# Patient Record
Sex: MALE | Race: WHITE | URBAN - METROPOLITAN AREA
[De-identification: names, ages, dates, MRNs, and addresses within clinical notes are randomized per-mention and may not be internally consistent; named-entity substitution may affect disease eponyms.]

---

## 2017-10-04 ENCOUNTER — OFFICE VISIT (OUTPATIENT)
Dept: FAMILY MEDICINE | Facility: CLINIC | Age: 52
End: 2017-10-04

## 2017-10-04 VITALS
TEMPERATURE: 98.3 F | HEIGHT: 69 IN | DIASTOLIC BLOOD PRESSURE: 88 MMHG | HEART RATE: 60 BPM | WEIGHT: 201.6 LBS | BODY MASS INDEX: 29.86 KG/M2 | RESPIRATION RATE: 20 BRPM | SYSTOLIC BLOOD PRESSURE: 136 MMHG

## 2017-10-04 DIAGNOSIS — E06.3 HASHIMOTO'S THYROIDITIS: ICD-10-CM

## 2017-10-04 DIAGNOSIS — E78.5 HYPERLIPIDEMIA LDL GOAL <130: ICD-10-CM

## 2017-10-04 DIAGNOSIS — Z76.89 ENCOUNTER TO ESTABLISH CARE: Primary | ICD-10-CM

## 2017-10-04 DIAGNOSIS — Z71.89 ACP (ADVANCE CARE PLANNING): ICD-10-CM

## 2017-10-04 DIAGNOSIS — Z23 NEED FOR VACCINATION: ICD-10-CM

## 2017-10-04 DIAGNOSIS — F41.1 GENERALIZED ANXIETY DISORDER: ICD-10-CM

## 2017-10-04 PROBLEM — G47.33 OSA (OBSTRUCTIVE SLEEP APNEA): Status: ACTIVE | Noted: 2017-10-04

## 2017-10-04 PROBLEM — E55.9 VITAMIN D DEFICIENCY: Status: ACTIVE | Noted: 2017-10-04

## 2017-10-04 PROCEDURE — 90472 IMMUNIZATION ADMIN EACH ADD: CPT | Performed by: PHYSICIAN ASSISTANT

## 2017-10-04 PROCEDURE — 36415 COLL VENOUS BLD VENIPUNCTURE: CPT | Performed by: PHYSICIAN ASSISTANT

## 2017-10-04 PROCEDURE — 90686 IIV4 VACC NO PRSV 0.5 ML IM: CPT | Performed by: PHYSICIAN ASSISTANT

## 2017-10-04 PROCEDURE — 84439 ASSAY OF FREE THYROXINE: CPT | Mod: 90 | Performed by: PHYSICIAN ASSISTANT

## 2017-10-04 PROCEDURE — 84443 ASSAY THYROID STIM HORMONE: CPT | Mod: 90 | Performed by: PHYSICIAN ASSISTANT

## 2017-10-04 PROCEDURE — 99202 OFFICE O/P NEW SF 15 MIN: CPT | Mod: 25 | Performed by: PHYSICIAN ASSISTANT

## 2017-10-04 PROCEDURE — 90715 TDAP VACCINE 7 YRS/> IM: CPT | Performed by: PHYSICIAN ASSISTANT

## 2017-10-04 PROCEDURE — 90471 IMMUNIZATION ADMIN: CPT | Performed by: PHYSICIAN ASSISTANT

## 2017-10-04 RX ORDER — PRAVASTATIN SODIUM 20 MG
20 TABLET ORAL DAILY
Qty: 90 TABLET | Refills: 3 | COMMUNITY
Start: 2017-10-04 | End: 2017-10-04

## 2017-10-04 RX ORDER — LEVOTHYROXINE SODIUM 100 UG/1
100 TABLET ORAL DAILY
Qty: 90 TABLET | Refills: 3 | Status: SHIPPED | OUTPATIENT
Start: 2017-10-04 | End: 2018-10-19 | Stop reason: DRUGHIGH

## 2017-10-04 RX ORDER — VENLAFAXINE HYDROCHLORIDE 150 MG/1
150 CAPSULE, EXTENDED RELEASE ORAL DAILY
Qty: 90 CAPSULE | Refills: 0 | Status: SHIPPED | OUTPATIENT
Start: 2017-10-04 | End: 2018-02-05

## 2017-10-04 RX ORDER — PRAVASTATIN SODIUM 20 MG
20 TABLET ORAL DAILY
Qty: 90 TABLET | Refills: 0 | Status: SHIPPED | OUTPATIENT
Start: 2017-10-04 | End: 2018-02-19

## 2017-10-04 RX ORDER — LEVOTHYROXINE SODIUM 100 UG/1
100 TABLET ORAL DAILY
Qty: 90 TABLET | Refills: 1 | COMMUNITY
Start: 2017-10-04 | End: 2017-10-04

## 2017-10-04 RX ORDER — VENLAFAXINE HYDROCHLORIDE 150 MG/1
150 CAPSULE, EXTENDED RELEASE ORAL DAILY
Qty: 90 CAPSULE | Refills: 1 | COMMUNITY
Start: 2017-10-04 | End: 2017-10-04

## 2017-10-04 NOTE — MR AVS SNAPSHOT
After Visit Summary   10/4/2017    Claude Dorsey    MRN: [id number]           Patient Information     Date Of Birth          1965        Visit Information        Provider Department      10/4/2017 11:00 AM Alberta Singh PA-C [facility]        Today's Diagnoses     Encounter to establish care    -  1    Hashimoto's thyroiditis        Hyperlipidemia LDL goal <130        Generalized anxiety disorder        Need for vaccination        ACP (advance care planning)           Follow-ups after your visit        Follow-up notes from your care team     Return in about 3 months (around 1/4/2018) for Physical Exam, Lab Work.      Who to contact     If you have questions or need follow up information about today's clinic visit or your schedule please contact [facility] directly at [phone].  Normal or non-critical lab and imaging results will be communicated to you by Solarcenturyhart, letter or phone within 4 business days after the clinic has received the results. If you do not hear from us within 7 days, please contact the clinic through Solarcenturyhart or phone. If you have a critical or abnormal lab result, we will notify you by phone as soon as possible.  Submit refill requests through ZinkoTek or call your pharmacy and they will forward the refill request to us. Please allow 3 business days for your refill to be completed.          Additional Information About Your Visit        Solarcenturyhart Information     ZinkoTek gives you secure access to your electronic health record. If you see a primary care provider, you can also send messages to your care team and make appointments. If you have questions, please call your primary care clinic.  If you do not have a primary care provider, please call [phone] and they will assist you.        Care EveryWhere ID     This is your Care EveryWhere ID. This could be used by other organizations to access your [facility]  "records  [id number]        Your Vitals Were     Pulse Temperature Respirations Height BMI (Body Mass Index)       60 98.3  F (36.8  C) (Oral) 20 1.74 m (5' 8.5\") 30.21 kg/m2        Blood Pressure from Last 3 Encounters:   10/04/17 136/88    Weight from Last 3 Encounters:   10/04/17 91.4 kg (201 lb 9.6 oz)              We Performed the Following     HC FLU VAC PRESRV FREE QUAD SPLIT VIR 3+YRS IM     T4 free ([facility])     TDAP VACCINE (BOOSTRIX)     TSH ([facility])     VACCINE ADMINISTRATION, EACH ADDITIONAL     VACCINE ADMINISTRATION, INITIAL     VENOUS COLLECTION          Today's Medication Changes          These changes are accurate as of: 10/4/17  4:46 PM.  If you have any questions, ask your nurse or doctor.               Start taking these medicines.        Dose/Directions    levothyroxine 100 MCG tablet   Commonly known as:  SYNTHROID/LEVOTHROID   Used for:  Hashimoto's thyroiditis   Started by:  Alberta Singh PA-C        Dose:  100 mcg   Take 1 tablet (100 mcg) by mouth daily   Quantity:  90 tablet   Refills:  3            Where to get your medicines      These medications were sent to [facility] [address] [place], [place] - [address]  [address] [facility] [address]     Phone:  [phone]     levothyroxine 100 MCG tablet    pravastatin 20 MG tablet    venlafaxine 150 MG 24 hr capsule                Primary Care Provider Office Phone # Fax #    Alberta Singh PA-C [phone]       [facility] [address]  [facility] [address]        Equal Access to Services     [facility] AH: Hadii aad ku hadasho Soomaali, waaxda luqadaha, qaybta kaalmada adeegyada, ashley coates haybarber forman. So [facility] [phone].    ATENCIÓN: Si habla español, tiene a mckeon disposición servicios gratuitos de asistencia lingüística. Llame al [phone].    We comply with applicable federal civil rights laws and [place] laws. We do not discriminate " on the basis of race, color, national origin, age, disability, sex, sexual orientation, or gender identity.            Thank you!     Thank you for choosing [facility]  for your care. Our goal is always to provide you with excellent care. Hearing back from our patients is one way we can continue to improve our services. Please take a few minutes to complete the written survey that you may receive in the mail after your visit with us. Thank you!             Your Updated Medication List - Protect others around you: Learn how to safely use, store and throw away your medicines at www.disposemymeds.org.          This list is accurate as of: 10/4/17  4:46 PM.  Always use your most recent med list.                   Brand Name Dispense Instructions for use Diagnosis    levothyroxine 100 MCG tablet    SYNTHROID/LEVOTHROID    90 tablet    Take 1 tablet (100 mcg) by mouth daily    Hashimoto's thyroiditis       pravastatin 20 MG tablet    PRAVACHOL    90 tablet    Take 1 tablet (20 mg) by mouth daily    Hyperlipidemia LDL goal <130       venlafaxine 150 MG 24 hr capsule    EFFEXOR-XR    90 capsule    Take 1 capsule (150 mg) by mouth daily    Generalized anxiety disorder

## 2017-10-04 NOTE — LETTER
[facility]  [address]  [address]  [facility] [address]  [phone]      October 4, 2017      Claude Ericksonphrey  [address] [facility] [address]      EMERGENCY CARE PLAN  Presenting Problem Treatment Plan   Questions or concerns during clinic hours I will call the clinic directly:    [facility]  [address]  [place] [address]  [phone]   Questions or concerns outside clinic hours  I will call the 24 hour line at [phone]   Patient needs to schedule an appointment  I will call the  scheduling line at [phone]   Same day treatment   I will call the clinic first, then  urgent care and/or  express care if needed   Clinic Care Coordinators MOIRA Contreras:  [phone]  Yessica Danielle RN:  [phone]  [facility] Clinical Support Staff: [phone]    Crisis Services:  Behavioral or Mental Health BHP (Behavioral Health Providers)   [phone]   Emergency treatment--Immediately CALL [phone]

## 2017-10-04 NOTE — PROGRESS NOTES
"CC: Establish     History:  Emerson is a new patient to our clinic from [facility]. He is generally healthy, but does take medications for hypothyroid, MIKE, and mixed hyperlipidemia.    Has been off levothyroxine for 3 days now. Back in February was increased from 75 mcg to 100 mcg has not had blood checked since that time    Taking Effexor  mg daily. Tolerates this well. Feels that it works well to control excess anxiety. No side effects.    Pravastatin for hyperlipidemia. Tolerates well. No side effects. No muscle aches, joint pains from medication.     Chart updated with PMH, surgery, family history, etc. Will scan relevant records into chart for future reference. Plans to return for physical in the next several months. Would like refill of 3 medications until that time.      PMH, MEDICATIONS, ALLERGIES, SOCIAL AND FAMILY HISTORY in [facility] and reviewed by me personally.      ROS negative other than the symptoms noted above in the HPI.        Examination   /88 (BP Location: Left arm, Patient Position: Chair, Cuff Size: Adult Regular)  Pulse 60  Temp 98.3  F (36.8  C) (Oral)  Resp 20  Ht 1.74 m (5' 8.5\")  Wt 91.4 kg (201 lb 9.6 oz)  BMI 30.21 kg/m2       Constitutional: Sitting comfortably, in no acute distress. Vital signs noted  Eyes: pupils equal round reactive to light and accomodation, extra ocular movements intact  Neck:  no adenopathy, trachea midline and normal to palpation  Cardiovascular:  regular rate and rhythm, no murmurs, clicks, or gallops  Respiratory:  normal respiratory rate and rhythm, lungs clear to auscultation  SKIN: No jaundice/pallor/rash.   Psychiatric: mentation appears normal and affect normal/bright        A/P    ICD-10-CM    1. Hashimoto's thyroiditis E06.3 VENOUS COLLECTION     TSH ([facility])     T4 free ([facility])     levothyroxine (SYNTHROID/LEVOTHROID) 100 MCG tablet   2. Hyperlipidemia LDL goal <130 E78.5 pravastatin (PRAVACHOL) 20 MG tablet   3. Generalized anxiety " disorder F41.1 venlafaxine (EFFEXOR-XR) 150 MG 24 hr capsule   4. Need for vaccination Z23 HC FLU VAC PRESRV FREE QUAD SPLIT VIR 3+YRS IM     VACCINE ADMINISTRATION, INITIAL     TDAP VACCINE (BOOSTRIX)     VACCINE ADMINISTRATION, EACH ADDITIONAL   5. ACP (advance care planning) Z71.89        DISCUSSION:     (Z76.89) Encounter to establish care  (primary encounter diagnosis)  Comment: Emerson appears to be doing well with all his chronic conditions. Chart updated.    (E06.3) Hashimoto's thyroiditis  Comment: Will check non-fasting labs today and send 1 year of refills.   Plan: VENOUS COLLECTION, TSH ([facility]), T4 free         ([facility]), levothyroxine (SYNTHROID/LEVOTHROID)         100 MCG tablet    (E78.5) Hyperlipidemia LDL goal <130  Comment: Not fasting today, will send 3 months of refills, to reach fasting physical.   Plan: pravastatin (PRAVACHOL) 20 MG tablet    (F41.1) Generalized anxiety disorder  Comment: will send 3 months of refills, to reach fasting physical.   Plan: venlafaxine (EFFEXOR-XR) 150 MG 24 hr capsule    (Z23) Need for vaccination  Plan: HC FLU VAC PRESRV FREE QUAD SPLIT VIR 3+YRS IM,        VACCINE ADMINISTRATION, INITIAL, TDAP VACCINE         (BOOSTRIX), VACCINE ADMINISTRATION, EACH         ADDITIONAL    (Z71.89) ACP (advance care planning)      follow up visit: 3 months, physical, fasting.    Alberta Singh PA-C  [facility]

## 2017-10-04 NOTE — NURSING NOTE
Claude Dorsey is here to establish care and get to know the provider.    Questioned patient about current smoking habits.  Pt. has never smoked.  PULSE regular  My Chart: accepts  CLASSIFICATION OF OVERWEIGHT AND OBESITY BY BMI                        Obesity Class           BMI(kg/m2)  Underweight                                    < 18.5  Normal                                         18.5-24.9  Overweight                                     25.0-29.9  OBESITY                     I                  30.0-34.9                             II                 35.0-39.9  EXTREME OBESITY             III                >40                            Patient's  BMI Body mass index is 30.21 kg/(m^2).  http://hin.nhlbi.nih.gov/menuplanner/menu.cgi  Pre-visit planning  Immunizations - up to date  Colonoscopy - is up to date  Mammogram - na  Asthma -   PHQ9 -    MIKE-7 -

## 2017-10-05 ENCOUNTER — MYC REFILL (OUTPATIENT)
Dept: FAMILY MEDICINE | Facility: CLINIC | Age: 52
End: 2017-10-05

## 2017-10-05 DIAGNOSIS — F41.1 GENERALIZED ANXIETY DISORDER: ICD-10-CM

## 2017-10-05 LAB
T4, FREE, NON-DIALYSIS - QUEST: 0.9 NG/DL (ref 0.8–1.8)
TSH SERPL-ACNC: 4.57 MIU/L (ref 0.4–4.5)

## 2017-10-05 RX ORDER — VENLAFAXINE HYDROCHLORIDE 150 MG/1
150 CAPSULE, EXTENDED RELEASE ORAL DAILY
Qty: 90 CAPSULE | Refills: 0 | Status: CANCELLED | OUTPATIENT
Start: 2017-10-05

## 2018-01-09 ENCOUNTER — TRANSFERRED RECORDS (OUTPATIENT)
Dept: FAMILY MEDICINE | Facility: CLINIC | Age: 53
End: 2018-01-09

## 2018-01-09 PROBLEM — E66.9 OBESITY: Status: ACTIVE | Noted: 2018-01-09

## 2018-01-09 PROBLEM — N40.1 BENIGN PROSTATIC HYPERPLASIA WITH URINARY FREQUENCY: Status: ACTIVE | Noted: 2018-01-09

## 2018-01-09 PROBLEM — R35.0 BENIGN PROSTATIC HYPERPLASIA WITH URINARY FREQUENCY: Status: ACTIVE | Noted: 2018-01-09

## 2018-02-05 ENCOUNTER — TELEPHONE (OUTPATIENT)
Dept: FAMILY MEDICINE | Facility: CLINIC | Age: 53
End: 2018-02-05

## 2018-02-05 DIAGNOSIS — F41.1 GENERALIZED ANXIETY DISORDER: ICD-10-CM

## 2018-02-05 RX ORDER — VENLAFAXINE HYDROCHLORIDE 150 MG/1
150 CAPSULE, EXTENDED RELEASE ORAL DAILY
Qty: 30 CAPSULE | Refills: 0 | COMMUNITY
Start: 2018-02-05 | End: 2018-03-01 | Stop reason: DRUGHIGH

## 2018-02-05 NOTE — TELEPHONE ENCOUNTER
Ok refill of venlafaxine for one month only called into [facility]. Pt needs fasting CPX OV for further refills.     Thanks,Catherine    [phone] (home)

## 2018-02-11 ENCOUNTER — HEALTH MAINTENANCE LETTER (OUTPATIENT)
Age: 53
End: 2018-02-11

## 2018-02-19 ENCOUNTER — OFFICE VISIT (OUTPATIENT)
Dept: FAMILY MEDICINE | Facility: CLINIC | Age: 53
End: 2018-02-19

## 2018-02-19 VITALS
HEIGHT: 69 IN | HEART RATE: 68 BPM | SYSTOLIC BLOOD PRESSURE: 128 MMHG | WEIGHT: 212.6 LBS | BODY MASS INDEX: 31.49 KG/M2 | TEMPERATURE: 98.3 F | RESPIRATION RATE: 20 BRPM | DIASTOLIC BLOOD PRESSURE: 90 MMHG

## 2018-02-19 DIAGNOSIS — G47.33 OSA (OBSTRUCTIVE SLEEP APNEA): ICD-10-CM

## 2018-02-19 DIAGNOSIS — E78.5 HYPERLIPIDEMIA LDL GOAL <130: ICD-10-CM

## 2018-02-19 DIAGNOSIS — F41.1 GAD (GENERALIZED ANXIETY DISORDER): Primary | ICD-10-CM

## 2018-02-19 PROCEDURE — 99213 OFFICE O/P EST LOW 20 MIN: CPT | Performed by: PHYSICIAN ASSISTANT

## 2018-02-19 RX ORDER — PRAVASTATIN SODIUM 20 MG
20 TABLET ORAL DAILY
Qty: 90 TABLET | Refills: 1 | Status: SHIPPED | OUTPATIENT
Start: 2018-02-19 | End: 2018-02-28

## 2018-02-19 RX ORDER — VENLAFAXINE HYDROCHLORIDE 75 MG/1
75 CAPSULE, EXTENDED RELEASE ORAL DAILY
Qty: 90 CAPSULE | Refills: 1 | Status: SHIPPED | OUTPATIENT
Start: 2018-02-19 | End: 2018-02-28

## 2018-02-19 NOTE — NURSING NOTE
Claude Dorsey is here for a medication check and refill. He is not fasting today.    Questioned patient about current smoking habits.  Pt. has never smoked.  PULSE regular  My Chart: active  CLASSIFICATION OF OVERWEIGHT AND OBESITY BY BMI                        Obesity Class           BMI(kg/m2)  Underweight                                    < 18.5  Normal                                         18.5-24.9  Overweight                                     25.0-29.9  OBESITY                     I                  30.0-34.9                             II                 35.0-39.9  EXTREME OBESITY             III                >40                            Patient's  BMI Body mass index is 31.86 kg/(m^2).  http://hin.nhlbi.nih.gov/menuplanner/menu.cgi  Pre-visit planning  Immunizations - up to date  Colonoscopy - is due and to be scheduled by patient for later completion  Mammogram -   Asthma -   PHQ9 -    MIKE-7 -

## 2018-02-19 NOTE — PROGRESS NOTES
"CC: Medication Check    History:    Emerson takes Effexorx XR for anxiety. Effexor started several years ago (at least 5), and before had Wellbutrin, Celexa. Takes for more anxiety than depression. Continues to be low energy, and wonders if it may be a side effect of the Effexor. Has been mainly watching TV and sleeping, without any energy to do otherwise. Takes thyroid supplement, but was last checked 10/2017, and was normal.     Uses CPAP every night and has not been seen for it for years. Realizes he should get this checked again. Takes vitamin D.     Takes pravastatin. No side effects. Not fasting today, but will return for fasting labs.     Does not check BP at home. Less active, especially with fatigue.     PMH, MEDICATIONS, ALLERGIES, SOCIAL AND FAMILY HISTORY in [facility] and reviewed by me personally.      ROS negative other than the symptoms noted above in the HPI.        Examination   /90 (BP Location: Left arm, Patient Position: Chair, Cuff Size: Adult Regular)  Pulse 68  Temp 98.3  F (36.8  C) (Oral)  Resp 20  Ht 1.74 m (5' 8.5\")  Wt 96.4 kg (212 lb 9.6 oz)  BMI 31.86 kg/m2       Constitutional: Sitting comfortably, in no acute distress. Vital signs noted. BP borderline.  Neck:  no adenopathy, trachea midline and normal to palpation  Cardiovascular:  regular rate and rhythm, no murmurs, clicks, or gallops  Respiratory:  normal respiratory rate and rhythm, lungs clear to auscultation  SKIN: No jaundice/pallor/rash.   Psychiatric: mentation appears normal and affect normal/bright        A/P    ICD-10-CM    1. MIKE (generalized anxiety disorder) F41.1 venlafaxine (EFFEXOR-XR) 75 MG 24 hr capsule   2. CHELSEA (obstructive sleep apnea) G47.33 SLEEP EVALUATION & MANAGEMENT REFERRAL - ADULT -[facility] - Numerous Locations - [phone]   3. Hyperlipidemia LDL goal <130 E78.5 pravastatin (PRAVACHOL) 20 MG tablet     VENOUS COLLECTION     Comprehensive metabolic panel     Lipid Profile ([facility]) "       DISCUSSION: Given continued fatigue, recommended trial of lower dose Effexor. Will changed to 75 mg daily. If Effexor is causing fatigue, this should at least help the fatigue. If fatigued resolves with this change, but anxiety returns, may need to consider adjuvant with buspar. If fatigue improves, but doesn't resolve may need to completely switch mental health medications.     Will have him return for fasting labs, but for now will send next 6 months of pravastatin. Can also consider trial off pravastatin in the future to see if that could be causing fatigue.    Gave referral for CHELSEA recheck and to have CPAP adjusted. May be out of date technology.     Increase vitamin D to 4000 U daily.     follow up visit: 6 months    Alberta Singh PA-C  [facility]

## 2018-02-19 NOTE — MR AVS SNAPSHOT
After Visit Summary   2/19/2018    Claude Dorsey    MRN: [id number]           Patient Information     Date Of Birth          1965        Visit Information        Provider Department      2/19/2018 10:15 AM Alberta Singh PA-C [facility]        Today's Diagnoses     MIKE (generalized anxiety disorder)    -  1    CHELSEA (obstructive sleep apnea)        Hyperlipidemia LDL goal <130           Follow-ups after your visit        Additional Services     SLEEP EVALUATION & MANAGEMENT REFERRAL - [facility] - Numerous Locations - [phone]       Please be aware that coverage of these services is subject to the terms and limitations of your health insurance plan.  Call member services at your health plan with any benefit or coverage questions.      Please bring the following to your appointment:    >>   List of current medications   >>   This referral request   >>   Any documents/labs given to you for this referral                      Follow-up notes from your care team     Return in about 6 months (around 8/19/2018) for Routine Visit.      Your next 10 appointments already scheduled     Feb 24, 2018  9:00 AM CST   Lab Appointment with [facility] LAB/XRAY   [facility] ([facility])    [address]  [address]  [facility] [address] [phone]              Future tests that were ordered for you today     Open Standing Orders        Priority Remaining Interval Expires Ordered    VENOUS COLLECTION Routine 1/1 2/19/2019 2/19/2018    Comprehensive metabolic panel Routine 1/1 2/19/2019 2/19/2018    Lipid Profile ([facility]) Routine 1/1 2/19/2019 2/19/2018          Open Future Orders        Priority Expected Expires Ordered    SLEEP EVALUATION & MANAGEMENT REFERRAL - [facility] - Numerous Locations - [phone] Routine  2/19/2019 2/19/2018            Who to contact     If you have questions  "or need follow up information about today's clinic visit or your schedule please contact [facility] directly at [phone].  Normal or non-critical lab and imaging results will be communicated to you by Vitronet Grouphart, letter or phone within 4 business days after the clinic has received the results. If you do not hear from us within 7 days, please contact the clinic through Vitronet Grouphart or phone. If you have a critical or abnormal lab result, we will notify you by phone as soon as possible.  Submit refill requests through Fotolog or call your pharmacy and they will forward the refill request to us. Please allow 3 business days for your refill to be completed.          Additional Information About Your Visit        Vitronet GroupharThe Kitchen Hotline Information     Fotolog gives you secure access to your electronic health record. If you see a primary care provider, you can also send messages to your care team and make appointments. If you have questions, please call your primary care clinic.  If you do not have a primary care provider, please call [phone] and they will assist you.        Care EveryWhere ID     This is your Care EveryWhere ID. This could be used by other organizations to access your [facility] medical records  [id number]        Your Vitals Were     Pulse Temperature Respirations Height BMI (Body Mass Index)       68 98.3  F (36.8  C) (Oral) 20 1.74 m (5' 8.5\") 31.86 kg/m2        Blood Pressure from Last 3 Encounters:   02/19/18 128/90   10/04/17 136/88    Weight from Last 3 Encounters:   02/19/18 96.4 kg (212 lb 9.6 oz)   10/04/17 91.4 kg (201 lb 9.6 oz)                 Today's Medication Changes          These changes are accurate as of 2/19/18  4:54 PM.  If you have any questions, ask your nurse or doctor.               These medicines have changed or have updated prescriptions.        Dose/Directions    * venlafaxine 150 MG 24 hr capsule   Commonly known as:  EFFEXOR-XR   This may have changed:  Another " medication with the same name was added. Make sure you understand how and when to take each.   Used for:  Generalized anxiety disorder   Changed by:  Alberta Singh PA-C        Dose:  150 mg   Take 1 capsule (150 mg) by mouth daily   Quantity:  30 capsule   Refills:  0       * venlafaxine 75 MG 24 hr capsule   Commonly known as:  EFFEXOR-XR   This may have changed:  You were already taking a medication with the same name, and this prescription was added. Make sure you understand how and when to take each.   Used for:  MIKE (generalized anxiety disorder)   Changed by:  Alberta Singh PA-C        Dose:  75 mg   Take 1 capsule (75 mg) by mouth daily   Quantity:  90 capsule   Refills:  1       * Notice:  This list has 2 medication(s) that are the same as other medications prescribed for you. Read the directions carefully, and ask your doctor or other care provider to review them with you.         Where to get your medicines      These medications were sent to [facility] [address] [facility] [address]     Phone:  [phone]     pravastatin 20 MG tablet    venlafaxine 75 MG 24 hr capsule                Primary Care Provider Office Phone # Fax #    Alberta Singh PA-C [phone] [address] [address]        Equal Access to Services     [facility] : Hadii dori ku hadasho Soomaali, waaxda luqadaha, qaybta kaalmada adeegyada, ashley peñaloza . So [facility] [phone].    ATENCIÓN: Si habla español, tiene a mckeon disposición servicios gratuitos de asistencia lingüística. Llame al [phone].    We comply with applicable federal civil rights laws and [place] laws. We do not discriminate on the basis of race, color, national origin, age, disability, sex, sexual orientation, or gender identity.            Thank you!     Thank you  for choosing [facility]  for your care. Our goal is always to provide you with excellent care. Hearing back from our patients is one way we can continue to improve our services. Please take a few minutes to complete the written survey that you may receive in the mail after your visit with us. Thank you!             Your Updated Medication List - Protect others around you: Learn how to safely use, store and throw away your medicines at www.disposemymeds.org.          This list is accurate as of 2/19/18  4:54 PM.  Always use your most recent med list.                   Brand Name Dispense Instructions for use Diagnosis    levothyroxine 100 MCG tablet    SYNTHROID/LEVOTHROID    90 tablet    Take 1 tablet (100 mcg) by mouth daily    Hashimoto's thyroiditis       pravastatin 20 MG tablet    PRAVACHOL    90 tablet    Take 1 tablet (20 mg) by mouth daily    Hyperlipidemia LDL goal <130       * venlafaxine 150 MG 24 hr capsule    EFFEXOR-XR    30 capsule    Take 1 capsule (150 mg) by mouth daily    Generalized anxiety disorder       * venlafaxine 75 MG 24 hr capsule    EFFEXOR-XR    90 capsule    Take 1 capsule (75 mg) by mouth daily    MIKE (generalized anxiety disorder)       * Notice:  This list has 2 medication(s) that are the same as other medications prescribed for you. Read the directions carefully, and ask your doctor or other care provider to review them with you.

## 2018-02-24 DIAGNOSIS — E78.5 HYPERLIPIDEMIA LDL GOAL <130: ICD-10-CM

## 2018-02-24 PROCEDURE — 36415 COLL VENOUS BLD VENIPUNCTURE: CPT | Performed by: PHYSICIAN ASSISTANT

## 2018-02-24 PROCEDURE — 80053 COMPREHEN METABOLIC PANEL: CPT | Mod: 90 | Performed by: PHYSICIAN ASSISTANT

## 2018-02-24 PROCEDURE — 80061 LIPID PANEL: CPT | Mod: 90 | Performed by: PHYSICIAN ASSISTANT

## 2018-02-24 NOTE — NURSING NOTE
Claude Dorsey is here today for a lab only fasting blood draw. Orders released from standing.  Orly Turner, CMA

## 2018-02-25 LAB
ALBUMIN SERPL-MCNC: 4.4 G/DL (ref 3.6–5.1)
ALBUMIN/GLOB SERPL: 1.8 (CALC) (ref 1–2.5)
ALP SERPL-CCNC: 62 U/L (ref 40–115)
ALT SERPL-CCNC: 28 U/L (ref 9–46)
AST SERPL-CCNC: 22 U/L (ref 10–35)
BILIRUB SERPL-MCNC: 0.8 MG/DL (ref 0.2–1.2)
BUN SERPL-MCNC: 13 MG/DL (ref 7–25)
BUN/CREATININE RATIO: NORMAL (CALC) (ref 6–22)
CALCIUM SERPL-MCNC: 9.5 MG/DL (ref 8.6–10.3)
CHLORIDE SERPLBLD-SCNC: 105 MMOL/L (ref 98–110)
CHOLEST SERPL-MCNC: 258 MG/DL
CHOLEST/HDLC SERPL: 4 (CALC)
CO2 SERPL-SCNC: 26 MMOL/L (ref 20–31)
CREAT SERPL-MCNC: 1.13 MG/DL (ref 0.7–1.33)
EGFR AFRICAN AMERICAN - QUEST: 86 ML/MIN/1.73M2
GFR SERPL CREATININE-BSD FRML MDRD: 74 ML/MIN/1.73M2
GLOBULIN, CALCULATED - QUEST: 2.4 G/DL (CALC) (ref 1.9–3.7)
GLUCOSE - QUEST: 98 MG/DL (ref 65–99)
HDLC SERPL-MCNC: 64 MG/DL
LDLC SERPL CALC-MCNC: 172 MG/DL (CALC)
NONHDLC SERPL-MCNC: 194 MG/DL (CALC)
POTASSIUM SERPL-SCNC: 4.8 MMOL/L (ref 3.5–5.3)
PROT SERPL-MCNC: 6.8 G/DL (ref 6.1–8.1)
SODIUM SERPL-SCNC: 141 MMOL/L (ref 135–146)
TRIGL SERPL-MCNC: 97 MG/DL

## 2018-03-01 DIAGNOSIS — Z53.9 ERRONEOUS ENCOUNTER--DISREGARD: Primary | ICD-10-CM

## 2018-05-11 ENCOUNTER — TRANSFERRED RECORDS (OUTPATIENT)
Dept: FAMILY MEDICINE | Facility: CLINIC | Age: 53
End: 2018-05-11

## 2018-09-04 ENCOUNTER — MYC REFILL (OUTPATIENT)
Dept: FAMILY MEDICINE | Facility: CLINIC | Age: 53
End: 2018-09-04

## 2018-09-04 ENCOUNTER — MYC MEDICAL ADVICE (OUTPATIENT)
Dept: FAMILY MEDICINE | Facility: CLINIC | Age: 53
End: 2018-09-04

## 2018-09-04 DIAGNOSIS — F41.1 GAD (GENERALIZED ANXIETY DISORDER): ICD-10-CM

## 2018-09-04 DIAGNOSIS — E78.5 HYPERLIPIDEMIA LDL GOAL <130: ICD-10-CM

## 2018-09-04 NOTE — TELEPHONE ENCOUNTER
Denied refill of Effexor Xr to mail order. Pt needs OV for refills. Can have a 30 day to a local pharmacy if needed.  Also refill of pravastatin

## 2018-09-05 RX ORDER — VENLAFAXINE HYDROCHLORIDE 75 MG/1
75 CAPSULE, EXTENDED RELEASE ORAL DAILY
Qty: 30 CAPSULE | Refills: 0 | Status: CANCELLED | OUTPATIENT
Start: 2018-09-05

## 2018-09-05 RX ORDER — PRAVASTATIN SODIUM 20 MG
20 TABLET ORAL DAILY
Qty: 30 TABLET | Refills: 0 | Status: SHIPPED | OUTPATIENT
Start: 2018-09-05 | End: 2018-10-19

## 2018-09-05 RX ORDER — VENLAFAXINE HYDROCHLORIDE 75 MG/1
75 CAPSULE, EXTENDED RELEASE ORAL DAILY
Qty: 30 CAPSULE | Refills: 0 | Status: SHIPPED | OUTPATIENT
Start: 2018-09-05 | End: 2018-10-19 | Stop reason: DRUGHIGH

## 2018-09-05 NOTE — TELEPHONE ENCOUNTER
From: Catherine Harley CMA  To: Claude Dorsey  Sent: 9/4/2018 2:55 PM CDT  Subject: cass Norman, you should schedule a fasting office visit.    Thanks,Catherine  [phone]

## 2018-09-05 NOTE — TELEPHONE ENCOUNTER
Pending Prescriptions:                       Disp   Refills    venlafaxine (EFFEXOR-XR) 75 MG 24 hr caps*30 cap*0            Sig: Take 1 capsule (75 mg) by mouth daily      LAST REFILL  Pt due for a FASTING ov  Please fax 30 and close encounter  [place]  [phone] (home)

## 2018-09-05 NOTE — TELEPHONE ENCOUNTER
[facility]  Emerson Dorsey would like a refill of the following medications:  venlafaxine (EFFEXOR-XR) 75 MG 24 hr capsule [Alberta Singh PA-C]  pravastatin (PRAVACHOL) 20 MG tablet [Alberta Singh PA-C]    Preferred pharmacy: [facility] mail order    Comment:

## 2018-09-05 NOTE — TELEPHONE ENCOUNTER
Pending Prescriptions:                       Disp   Refills    venlafaxine (EFFEXOR-XR) 75 MG 24 hr caps*30 cap*0            Sig: Take 1 capsule (75 mg) by mouth daily    pravastatin (PRAVACHOL) 20 MG tablet      30 tab*0            Sig: Take 1 tablet (20 mg) by mouth daily    Pt has been notified that is he is due for a FASTING ov  See previous my chart  Please fax 30 to local pharmacy is in  [place]  [phone] (home)

## 2018-09-07 ENCOUNTER — TRANSFERRED RECORDS (OUTPATIENT)
Dept: FAMILY MEDICINE | Facility: CLINIC | Age: 53
End: 2018-09-07

## 2018-10-18 ENCOUNTER — OFFICE VISIT (OUTPATIENT)
Dept: FAMILY MEDICINE | Facility: CLINIC | Age: 53
End: 2018-10-18

## 2018-10-18 VITALS
BODY MASS INDEX: 32.1 KG/M2 | OXYGEN SATURATION: 97 % | WEIGHT: 214.2 LBS | SYSTOLIC BLOOD PRESSURE: 130 MMHG | TEMPERATURE: 98.5 F | DIASTOLIC BLOOD PRESSURE: 80 MMHG | HEART RATE: 57 BPM

## 2018-10-18 DIAGNOSIS — Z23 NEED FOR VACCINATION: ICD-10-CM

## 2018-10-18 DIAGNOSIS — E78.5 HYPERLIPIDEMIA LDL GOAL <130: ICD-10-CM

## 2018-10-18 DIAGNOSIS — R53.83 OTHER FATIGUE: ICD-10-CM

## 2018-10-18 DIAGNOSIS — E78.2 MIXED HYPERLIPIDEMIA: ICD-10-CM

## 2018-10-18 DIAGNOSIS — F41.1 GAD (GENERALIZED ANXIETY DISORDER): ICD-10-CM

## 2018-10-18 DIAGNOSIS — E06.3 HASHIMOTO'S THYROIDITIS: Primary | ICD-10-CM

## 2018-10-18 LAB
ERYTHROCYTE [DISTWIDTH] IN BLOOD BY AUTOMATED COUNT: 13.2 %
HCT VFR BLD AUTO: 45.9 % (ref 40–53)
HEMOGLOBIN: 15 G/DL (ref 13.3–17.7)
MCH RBC QN AUTO: 30.3 PG (ref 26–33)
MCHC RBC AUTO-ENTMCNC: 32.7 G/DL (ref 31–36)
MCV RBC AUTO: 92.8 FL (ref 78–100)
PLATELET COUNT - QUEST: 224 10^9/L (ref 150–375)
RBC # BLD AUTO: 4.95 10*12/L (ref 4.4–5.9)
WBC # BLD AUTO: 4.9 10*9/L (ref 4–11)

## 2018-10-18 PROCEDURE — 80061 LIPID PANEL: CPT | Mod: 90 | Performed by: PHYSICIAN ASSISTANT

## 2018-10-18 PROCEDURE — 90471 IMMUNIZATION ADMIN: CPT | Performed by: PHYSICIAN ASSISTANT

## 2018-10-18 PROCEDURE — 82306 VITAMIN D 25 HYDROXY: CPT | Mod: 90 | Performed by: PHYSICIAN ASSISTANT

## 2018-10-18 PROCEDURE — 36415 COLL VENOUS BLD VENIPUNCTURE: CPT | Performed by: PHYSICIAN ASSISTANT

## 2018-10-18 PROCEDURE — 90686 IIV4 VACC NO PRSV 0.5 ML IM: CPT | Performed by: PHYSICIAN ASSISTANT

## 2018-10-18 PROCEDURE — 84439 ASSAY OF FREE THYROXINE: CPT | Mod: 90 | Performed by: PHYSICIAN ASSISTANT

## 2018-10-18 PROCEDURE — 80050 GENERAL HEALTH PANEL: CPT | Mod: 90 | Performed by: PHYSICIAN ASSISTANT

## 2018-10-18 PROCEDURE — 99213 OFFICE O/P EST LOW 20 MIN: CPT | Mod: 25 | Performed by: PHYSICIAN ASSISTANT

## 2018-10-18 RX ORDER — LEVOTHYROXINE SODIUM 100 UG/1
100 TABLET ORAL DAILY
Qty: 90 TABLET | Refills: 3 | Status: CANCELLED | OUTPATIENT
Start: 2018-10-18

## 2018-10-18 NOTE — PROGRESS NOTES
CC: Recheck medications    History:  Emerson is here today to refill medications.  Needs refills of his levothyroxine. Currently taking 100 mcg, but does not take it first thing in the morning.     At previous appt was decreased from Effexor  mg down to 75 mg to see if it helped with fatigue. Unfortunately, it did not seem to improve his fatigue, but also didn't worsen his mental health.    Unfortunately, Emerson continues to struggle with fatigue, and would like to try anything to help with this. Even mentions Provigil.     PMH, MEDICATIONS, ALLERGIES, SOCIAL AND FAMILY HISTORY in EPIC and reviewed by me personally.      ROS negative other than the symptoms noted above in the HPI.        Examination   /80 (BP Location: Left arm, Patient Position: Sitting, Cuff Size: Adult Large)  Pulse 57  Temp 98.5  F (36.9  C) (Oral)  Wt 97.2 kg (214 lb 3.2 oz)  SpO2 97%  BMI 32.1 kg/m2       Constitutional: Sitting comfortably, in no acute distress. Vital signs noted  Neck:  no adenopathy, trachea midline and normal to palpation  Cardiovascular:  regular rate and rhythm, no murmurs, clicks, or gallops  Respiratory:  normal respiratory rate and rhythm, lungs clear to auscultation  SKIN: No jaundice/pallor/rash.   Psychiatric: mentation appears normal and affect normal/bright        A/P    ICD-10-CM    1. Hashimoto's thyroiditis E06.3 VENOUS COLLECTION     Comprehensive metabolic panel     TSH ([facility])     T4 free ([facility])   2. Mixed hyperlipidemia E78.2 VENOUS COLLECTION     Lipid Profile ([facility])     Comprehensive metabolic panel   3. MIKE (generalized anxiety disorder) F41.1 VENOUS COLLECTION   4. Other fatigue R53.83 VENOUS COLLECTION     Vitamin D deficiency screening ([facility])     HEMOGRAM/PLATELET ([facility])   5. Need for vaccination Z23 HC FLU VAC PRESRV FREE QUAD SPLIT VIR 3+YRS IM     VACCINE ADMINISTRATION, INITIAL       DISCUSSION:  Will recheck labs today including thyroid, CMP, lipid profile, and contact him tomorrow  with results. Would like to try a series of changes to try to alleviate his fatigue. Will use thyroid lab to determine if increased dose would be appropriate. Would have him make that change if indicated. Otherwise, I would like to have him stop the pravastatin as that could be causing the fatigue. Would also consider increasing Effexor back up to 150. Will send message tomorrow based on results.     Update:   TSH was 3.22, so will increase dose to 112 mcg, if after 4 weeks that is not helping, will have him stop pravastatin. If after 4 weeks that's not helping will have him stop Effexor.     follow up visit: 2-3 months    Alberta Singh PA-C  [facility]

## 2018-10-18 NOTE — MR AVS SNAPSHOT
After Visit Summary   10/18/2018    Claude Dorsey    MRN: [id number]           Patient Information     Date Of Birth          1965        Visit Information        Provider Department      10/18/2018 9:45 AM Alberta Singh PA-C [facility]        Today's Diagnoses     Hashimoto's thyroiditis    -  1    Mixed hyperlipidemia        MIKE (generalized anxiety disorder)        Other fatigue        Need for vaccination           Follow-ups after your visit        Follow-up notes from your care team     Return if symptoms worsen or fail to improve.      Who to contact     If you have questions or need follow up information about today's clinic visit or your schedule please contact [facility] directly at [phone].  Normal or non-critical lab and imaging results will be communicated to you by "[employer]hart, letter or phone within 4 business days after the clinic has received the results. If you do not hear from us within 7 days, please contact the clinic through "[employer]"hart or phone. If you have a critical or abnormal lab result, we will notify you by phone as soon as possible.  Submit refill requests through [employer] or call your pharmacy and they will forward the refill request to us. Please allow 3 business days for your refill to be completed.          Additional Information About Your Visit        "[employer]hart Information     [employer] gives you secure access to your electronic health record. If you see a primary care provider, you can also send messages to your care team and make appointments. If you have questions, please call your primary care clinic.  If you do not have a primary care provider, please call [phone] and they will assist you.        Care EveryWhere ID     This is your Care EveryWhere ID. This could be used by other organizations to access your [facility] medical records  [id number]        Your Vitals Were     Pulse Temperature Pulse  Oximetry BMI (Body Mass Index)          57 98.5  F (36.9  C) (Oral) 97% 32.1 kg/m2         Blood Pressure from Last 3 Encounters:   10/18/18 130/80   02/19/18 128/90   10/04/17 136/88    Weight from Last 3 Encounters:   10/18/18 97.2 kg (214 lb 3.2 oz)   02/19/18 96.4 kg (212 lb 9.6 oz)   10/04/17 91.4 kg (201 lb 9.6 oz)              We Performed the Following     Comprehensive metabolic panel     HC FLU VAC PRESRV FREE QUAD SPLIT VIR 3+YRS IM     HEMOGRAM/PLATELET ([facility])     Lipid Profile ([facility])     T4 free ([facility])     TSH ([facility])     VACCINE ADMINISTRATION, INITIAL     VENOUS COLLECTION     Vitamin D deficiency screening ([facility])        Primary Care Provider Office Phone # Fax #    Alberta Singh PA-C [phone] [address] [facility] [address]  [facility] [address]        Equal Access to Services     ARNOL MARTINEZ : Hadii aad ku hadasho Soomaali, waaxda luqadaha, qaybta kaalmada adeegyada, waxay idiin hayaan gretchen peñaloza . So [facility] [phone].    ATENCIÓN: Si habla español, tiene a mckeon disposición servicios gratuitos de asistencia lingüística. Llame al [phone].    We comply with applicable federal civil rights laws and [place] laws. We do not discriminate on the basis of race, color, national origin, age, disability, sex, sexual orientation, or gender identity.            Thank you!     Thank you for choosing [facility]  for your care. Our goal is always to provide you with excellent care. Hearing back from our patients is one way we can continue to improve our services. Please take a few minutes to complete the written survey that you may receive in the mail after your visit with us. Thank you!             Your Updated Medication List - Protect others around you: Learn how to safely use, store and throw away your medicines at www.disposemymeds.org.          This list is accurate as of 10/18/18 10:55 PM.  Always use your most recent med list.                    Brand Name Dispense Instructions for use Diagnosis    levothyroxine 100 MCG tablet    SYNTHROID/LEVOTHROID    90 tablet    Take 1 tablet (100 mcg) by mouth daily    Hashimoto's thyroiditis       pravastatin 20 MG tablet    PRAVACHOL    30 tablet    Take 1 tablet (20 mg) by mouth daily    Hyperlipidemia LDL goal <130       venlafaxine 75 MG 24 hr capsule    EFFEXOR-XR    30 capsule    Take 1 capsule (75 mg) by mouth daily    MIKE (generalized anxiety disorder)

## 2018-10-18 NOTE — NURSING NOTE
Emerson is here for a fasting med check          Pre-visit Screening:  Immunizations:  up to date  Colonoscopy:  is up to date  Mammogram: NA  Asthma Action Test/Plan:  NA  PHQ9:  none  GAD7:  none  Questioned patient about current smoking habits Pt. has never smoked.  Ok to leave detailed message on voice mail for today's visit only Yes, phone # [phone]

## 2018-10-19 LAB
ALBUMIN SERPL-MCNC: 4.2 G/DL (ref 3.6–5.1)
ALBUMIN/GLOB SERPL: 1.8 (CALC) (ref 1–2.5)
ALP SERPL-CCNC: 59 U/L (ref 40–115)
ALT SERPL-CCNC: 36 U/L (ref 9–46)
AST SERPL-CCNC: 23 U/L (ref 10–35)
BILIRUB SERPL-MCNC: 0.7 MG/DL (ref 0.2–1.2)
BUN SERPL-MCNC: 15 MG/DL (ref 7–25)
BUN/CREATININE RATIO: NORMAL (CALC) (ref 6–22)
CALCIUM SERPL-MCNC: 9.4 MG/DL (ref 8.6–10.3)
CHLORIDE SERPLBLD-SCNC: 104 MMOL/L (ref 98–110)
CHOLEST SERPL-MCNC: 190 MG/DL
CHOLEST/HDLC SERPL: 2.9 (CALC)
CO2 SERPL-SCNC: 25 MMOL/L (ref 20–32)
CREAT SERPL-MCNC: 0.98 MG/DL (ref 0.7–1.33)
EGFR AFRICAN AMERICAN - QUEST: 101 ML/MIN/1.73M2
GFR SERPL CREATININE-BSD FRML MDRD: 87 ML/MIN/1.73M2
GLOBULIN, CALCULATED - QUEST: 2.3 G/DL (CALC) (ref 1.9–3.7)
GLUCOSE - QUEST: 96 MG/DL (ref 65–99)
HDLC SERPL-MCNC: 65 MG/DL
LDLC SERPL CALC-MCNC: 109 MG/DL (CALC)
NONHDLC SERPL-MCNC: 125 MG/DL (CALC)
POTASSIUM SERPL-SCNC: 4.7 MMOL/L (ref 3.5–5.3)
PROT SERPL-MCNC: 6.5 G/DL (ref 6.1–8.1)
SODIUM SERPL-SCNC: 139 MMOL/L (ref 135–146)
T4, FREE, NON-DIALYSIS - QUEST: 1 NG/DL (ref 0.8–1.8)
TRIGL SERPL-MCNC: 70 MG/DL
TSH SERPL-ACNC: 3.22 MIU/L (ref 0.4–4.5)
VITAMIN D, 25-OH, TOTAL - QUEST: 54 NG/ML (ref 30–100)

## 2018-10-19 RX ORDER — PRAVASTATIN SODIUM 20 MG
20 TABLET ORAL DAILY
Qty: 90 TABLET | Refills: 0 | Status: SHIPPED | OUTPATIENT
Start: 2018-10-19 | End: 2019-02-01

## 2018-10-19 RX ORDER — LEVOTHYROXINE SODIUM 112 UG/1
112 TABLET ORAL DAILY
Qty: 90 TABLET | Refills: 0 | Status: SHIPPED | OUTPATIENT
Start: 2018-10-19 | End: 2018-10-22

## 2018-10-19 RX ORDER — VENLAFAXINE HYDROCHLORIDE 75 MG/1
75 CAPSULE, EXTENDED RELEASE ORAL DAILY
Qty: 90 CAPSULE | Refills: 0 | Status: SHIPPED | OUTPATIENT
Start: 2018-10-19 | End: 2019-02-01 | Stop reason: DRUGHIGH

## 2018-10-22 DIAGNOSIS — E06.3 HASHIMOTO'S THYROIDITIS: ICD-10-CM

## 2018-10-22 RX ORDER — LEVOTHYROXINE SODIUM 112 UG/1
112 TABLET ORAL DAILY
Qty: 90 TABLET | Refills: 0 | Status: SHIPPED | OUTPATIENT
Start: 2018-10-22 | End: 2019-02-01

## 2018-10-22 NOTE — TELEPHONE ENCOUNTER
Needs to be sent to mail order as local [facility] has his levothyroxine on back order.    Thanks,Catherine Dorsey is requesting a refill of:    Pending Prescriptions:                       Disp   Refills    levothyroxine (SYNTHROID/LEVOTHROID) 112 *90 tab*0            Sig: Take 1 tablet (112 mcg) by mouth daily

## 2019-01-15 ENCOUNTER — MYC MEDICAL ADVICE (OUTPATIENT)
Dept: FAMILY MEDICINE | Facility: CLINIC | Age: 54
End: 2019-01-15

## 2019-01-15 NOTE — TELEPHONE ENCOUNTER
From: Claude Dorsey  To: Alberta Singh PA-C  Sent: 1/15/2019 1:37 PM CST  Subject: A follow-up question for a visit within the past seven days    Alberta, I can't find your orders from my last visit. I know you wanted me to quit each med one at a time. I've done that with the pravistatin and thyroid med. with no change, I'm reluctant on doing that with the Effexor because of its affects . Please advise, thanks Estiven

## 2019-02-01 ENCOUNTER — TELEPHONE (OUTPATIENT)
Dept: FAMILY MEDICINE | Facility: CLINIC | Age: 54
End: 2019-02-01

## 2019-02-01 ENCOUNTER — MYC MEDICAL ADVICE (OUTPATIENT)
Dept: FAMILY MEDICINE | Facility: CLINIC | Age: 54
End: 2019-02-01

## 2019-02-01 ENCOUNTER — OFFICE VISIT (OUTPATIENT)
Dept: FAMILY MEDICINE | Facility: CLINIC | Age: 54
End: 2019-02-01

## 2019-02-01 VITALS
BODY MASS INDEX: 30.51 KG/M2 | OXYGEN SATURATION: 95 % | SYSTOLIC BLOOD PRESSURE: 130 MMHG | WEIGHT: 206 LBS | DIASTOLIC BLOOD PRESSURE: 88 MMHG | HEIGHT: 69 IN | TEMPERATURE: 98.8 F | HEART RATE: 61 BPM

## 2019-02-01 DIAGNOSIS — E78.5 HYPERLIPIDEMIA LDL GOAL <130: ICD-10-CM

## 2019-02-01 DIAGNOSIS — E06.3 HASHIMOTO'S THYROIDITIS: ICD-10-CM

## 2019-02-01 DIAGNOSIS — F41.1 GAD (GENERALIZED ANXIETY DISORDER): Primary | ICD-10-CM

## 2019-02-01 DIAGNOSIS — Z80.0 FAMILY HISTORY OF COLON CANCER: ICD-10-CM

## 2019-02-01 DIAGNOSIS — Z12.11 ENCOUNTER FOR SCREENING COLONOSCOPY: ICD-10-CM

## 2019-02-01 DIAGNOSIS — F34.1 DYSTHYMIA: ICD-10-CM

## 2019-02-01 PROCEDURE — 84443 ASSAY THYROID STIM HORMONE: CPT | Mod: 90 | Performed by: PHYSICIAN ASSISTANT

## 2019-02-01 PROCEDURE — 99213 OFFICE O/P EST LOW 20 MIN: CPT | Performed by: PHYSICIAN ASSISTANT

## 2019-02-01 PROCEDURE — 36415 COLL VENOUS BLD VENIPUNCTURE: CPT | Performed by: PHYSICIAN ASSISTANT

## 2019-02-01 RX ORDER — VENLAFAXINE HYDROCHLORIDE 225 MG/1
225 TABLET, EXTENDED RELEASE ORAL DAILY
Qty: 30 TABLET | Refills: 0 | Status: SHIPPED | OUTPATIENT
Start: 2019-02-01 | End: 2019-02-01

## 2019-02-01 RX ORDER — PRAVASTATIN SODIUM 20 MG
20 TABLET ORAL DAILY
Qty: 90 TABLET | Refills: 1 | Status: SHIPPED | OUTPATIENT
Start: 2019-02-01 | End: 2019-08-20

## 2019-02-01 RX ORDER — VENLAFAXINE HYDROCHLORIDE 150 MG/1
150 TABLET, EXTENDED RELEASE ORAL DAILY
Qty: 30 TABLET | Refills: 0 | Status: SHIPPED | OUTPATIENT
Start: 2019-02-01 | End: 2019-02-01

## 2019-02-01 RX ORDER — LEVOTHYROXINE SODIUM 112 UG/1
112 TABLET ORAL DAILY
Qty: 90 TABLET | Refills: 3 | Status: SHIPPED | OUTPATIENT
Start: 2019-02-01 | End: 2019-08-20

## 2019-02-01 RX ORDER — VENLAFAXINE HYDROCHLORIDE 75 MG/1
75 CAPSULE, EXTENDED RELEASE ORAL DAILY
Qty: 90 CAPSULE | Refills: 0 | Status: CANCELLED | OUTPATIENT
Start: 2019-02-01

## 2019-02-01 RX ORDER — VENLAFAXINE HYDROCHLORIDE 150 MG/1
150 CAPSULE, EXTENDED RELEASE ORAL DAILY
Qty: 90 CAPSULE | Refills: 1 | Status: SHIPPED | OUTPATIENT
Start: 2019-02-01 | End: 2019-08-28

## 2019-02-01 RX ORDER — VENLAFAXINE HYDROCHLORIDE 150 MG/1
150 CAPSULE, EXTENDED RELEASE ORAL DAILY
Qty: 90 CAPSULE | Refills: 1 | Status: SHIPPED | OUTPATIENT
Start: 2019-02-01 | End: 2019-02-01

## 2019-02-01 ASSESSMENT — ANXIETY QUESTIONNAIRES
IF YOU CHECKED OFF ANY PROBLEMS ON THIS QUESTIONNAIRE, HOW DIFFICULT HAVE THESE PROBLEMS MADE IT FOR YOU TO DO YOUR WORK, TAKE CARE OF THINGS AT HOME, OR GET ALONG WITH OTHER PEOPLE: NOT DIFFICULT AT ALL
5. BEING SO RESTLESS THAT IT IS HARD TO SIT STILL: NOT AT ALL
6. BECOMING EASILY ANNOYED OR IRRITABLE: SEVERAL DAYS
2. NOT BEING ABLE TO STOP OR CONTROL WORRYING: NOT AT ALL
3. WORRYING TOO MUCH ABOUT DIFFERENT THINGS: NOT AT ALL
GAD7 TOTAL SCORE: 1
7. FEELING AFRAID AS IF SOMETHING AWFUL MIGHT HAPPEN: NOT AT ALL
1. FEELING NERVOUS, ANXIOUS, OR ON EDGE: NOT AT ALL

## 2019-02-01 ASSESSMENT — MIFFLIN-ST. JEOR: SCORE: 1756.85

## 2019-02-01 ASSESSMENT — PATIENT HEALTH QUESTIONNAIRE - PHQ9
SUM OF ALL RESPONSES TO PHQ QUESTIONS 1-9: 15
5. POOR APPETITE OR OVEREATING: NOT AT ALL

## 2019-02-01 NOTE — TELEPHONE ENCOUNTER
Does it say for what medication. If Effexor, he had already been covered on Effexor before, so I would want to do a PA.

## 2019-02-01 NOTE — NURSING NOTE
Emerson is here for a fasting med check.        Pre-visit Screening:  Immunizations:  up to date  Colonoscopy:  is due and to be scheduled by patient for later completion  Mammogram: NA  Asthma Action Test/Plan:  NA  PHQ9:  Done today  GAD7:  Done today  Questioned patient about current smoking habits Pt. has never smoked.  Ok to leave detailed message on voice mail for today's visit only Yes, phone # [phone]

## 2019-02-01 NOTE — PROGRESS NOTES
"CC: Fatigue, medication check     History:  Emerson is here today for refills of medications. Recently, we have done several trials on and off medications to try to find the cause of his fatigue and lethargy that he has been having for 4 years.     Did trial off pravastatin, which didn't help. 10/2018 decreased levothyroxine based on low TSH, and will recheck today.     We have not yet changed his Effexor, as he was nervous to stop that and cause anxiety symptoms to return. He knows he has been on several different mental health medications int he past, but Paxil is the only one he can remember clearly. Most of the medications he has tried caused sexual side effects.     Has had sleep study with CHELSEA, and uses CPAP. Feels this works well.     PMH, MEDICATIONS, ALLERGIES, SOCIAL AND FAMILY HISTORY in EPIC and reviewed by me personally.      ROS negative other than the symptoms noted above in the HPI.        Examination   /88 (BP Location: Left arm, Patient Position: Sitting, Cuff Size: Adult Large)   Pulse 61   Temp 98.8  F (37.1  C) (Oral)   Ht 1.74 m (5' 8.5\")   Wt 93.4 kg (206 lb)   SpO2 95%   BMI 30.87 kg/m         Constitutional: Sitting comfortably, in no acute distress. Vital signs noted  Neck:  no adenopathy, trachea midline and normal to palpation, thyroid normal to palpation  Cardiovascular:  regular rate and rhythm, no murmurs, clicks, or gallops  Respiratory:  normal respiratory rate and rhythm, lungs clear to auscultation  SKIN: No jaundice/pallor/rash.   Psychiatric: mentation appears normal and affect normal/bright        A/P    ICD-10-CM    1. MIKE (generalized anxiety disorder) F41.1 DISCONTINUED: venlafaxine (EFFEXOR-ER) 150 MG 24 hr tablet     DISCONTINUED: venlafaxine (EFFEXOR-ER) 225 MG 24 hr tablet     DISCONTINUED: venlafaxine (EFFEXOR-ER) 225 MG 24 hr tablet     DISCONTINUED: venlafaxine (EFFEXOR-ER) 150 MG 24 hr tablet   2. Hashimoto's thyroiditis E06.3 levothyroxine " (SYNTHROID/LEVOTHROID) 112 MCG tablet     TSH with free T4 reflex ([facility])     VENOUS COLLECTION   3. Hyperlipidemia LDL goal <130 E78.5 pravastatin (PRAVACHOL) 20 MG tablet   4. Family history of colon cancer Z80.0 GASTROENTEROLOGY ADULT REF PROCEDURE ONLY Other; [facility] [phone]   5. Encounter for screening colonoscopy Z12.11 GASTROENTEROLOGY ADULT REF PROCEDURE ONLY Other; [facility] [phone]       DISCUSSION:  Refilled pravastatin at current dose. Will recheck TSH today and sent through 1 year of levothyroxine at same dose, but will change if indicated.     MIKE-7 score today is well controlled, but interestingly, PHQ-9 score is 15. Difficult to discern if there is an underlying depression, or if depressive symptoms are from lethargy/fatigue symptoms. Only passive SI, no plan, no intent, feels like he is safe. Main change today is will Effexor. Will have him increase back up to 150 mg daily for 1 month, then as long as he is tolerating that, will increase to 225 mg daily with 150 + 75 mg capsules daily. Warned of possible side effects, and to contact me if noted. Encouraged him to try to find old records of what he may have been on before. May need him to f/u with psychiatrist for further medication management if this is not successful.     follow up visit: 2-3 months    Alberta Singh PA-C  [facility]

## 2019-02-01 NOTE — TELEPHONE ENCOUNTER
"Received incoming faxed message from [facility] pharmacy stating:  \"Alternative requested: Not covered. Need alternative or PA.\"     Routing to [facility] for review, should we do PA or is there something else that the patient can use instead?  "

## 2019-02-01 NOTE — TELEPHONE ENCOUNTER
Spoke to pharmacy to start PA and the pharmacist stated that the capsules are covered so we should just send those in for the patient. Routing to Alberta for review, is it ok to switch to the capsules?

## 2019-02-02 LAB — TSH SERPL-ACNC: 1.02 MIU/L (ref 0.4–4.5)

## 2019-02-02 ASSESSMENT — ANXIETY QUESTIONNAIRES: GAD7 TOTAL SCORE: 1

## 2019-02-05 NOTE — TELEPHONE ENCOUNTER
Patient expressed understanding to message from Alberta with no further questions or concerns at this time. Closing encounter due to no further action needed.

## 2019-02-05 NOTE — TELEPHONE ENCOUNTER
From: Alberta Singh PA-C  To: Claude WILLIAM Willian  Sent: 2/1/2019 3:06 PM CST  Subject: Update    Hi Emerson,    We received a call from your pharmacy that the Effexor 225 mg tablets are not covered. Previously you had been taking capsules and the capsule are still covered, but I cannot prescribe a 225 mg capsule for next month as it doesn't come in capsule form at this dose.    What I ended up doing is sending 90 days with 1 refill to both you local and mail order pharmacy of the Effexor 150 mg capsules. I will set a reminder for myself in 3 weeks to send a 30 day script at that time of an additional 75 mg capsule to take along with the 150 mg to try the further increased dose that we discussed. I will send that to your local pharmacy.     Hopefully that makes sense to you.     Please feel free to contact our clinic with any questions or concerns.     Alberta Singh PA-C  2/1/2019

## 2019-02-28 ENCOUNTER — MYC REFILL (OUTPATIENT)
Dept: FAMILY MEDICINE | Facility: CLINIC | Age: 54
End: 2019-02-28

## 2019-02-28 DIAGNOSIS — F41.1 GAD (GENERALIZED ANXIETY DISORDER): ICD-10-CM

## 2019-02-28 DIAGNOSIS — F41.1 GAD (GENERALIZED ANXIETY DISORDER): Primary | ICD-10-CM

## 2019-02-28 RX ORDER — VENLAFAXINE HYDROCHLORIDE 75 MG/1
75 TABLET, EXTENDED RELEASE ORAL DAILY
Qty: 30 EACH | Refills: 0 | Status: SHIPPED | OUTPATIENT
Start: 2019-02-28 | End: 2019-08-20

## 2019-02-28 RX ORDER — VENLAFAXINE HYDROCHLORIDE 75 MG/1
75 CAPSULE, EXTENDED RELEASE ORAL DAILY
Qty: 30 CAPSULE | Refills: 0 | Status: SHIPPED | OUTPATIENT
Start: 2019-02-28 | End: 2019-08-20

## 2019-03-01 NOTE — TELEPHONE ENCOUNTER
Claude Dorsey is requesting a refill of:    Pending Prescriptions:                       Disp   Refills    venlafaxine (EFFEXOR-XR) 75 MG 24 hr caps*30 cap*0            Sig: Take 1 capsule (75 mg) by mouth daily    Requesting the capsule as this is cheaper.  Thanks,Catherine

## 2019-03-09 ENCOUNTER — HEALTH MAINTENANCE LETTER (OUTPATIENT)
Age: 54
End: 2019-03-09

## 2019-08-20 ENCOUNTER — MYC REFILL (OUTPATIENT)
Dept: FAMILY MEDICINE | Facility: CLINIC | Age: 54
End: 2019-08-20

## 2019-08-20 ENCOUNTER — MYC MEDICAL ADVICE (OUTPATIENT)
Dept: FAMILY MEDICINE | Facility: CLINIC | Age: 54
End: 2019-08-20

## 2019-08-20 DIAGNOSIS — E78.5 HYPERLIPIDEMIA LDL GOAL <130: ICD-10-CM

## 2019-08-20 DIAGNOSIS — F41.1 GAD (GENERALIZED ANXIETY DISORDER): ICD-10-CM

## 2019-08-20 DIAGNOSIS — E06.3 HASHIMOTO'S THYROIDITIS: ICD-10-CM

## 2019-08-20 RX ORDER — LEVOTHYROXINE SODIUM 112 UG/1
112 TABLET ORAL DAILY
Qty: 90 TABLET | Refills: 1 | Status: SHIPPED | OUTPATIENT
Start: 2019-08-20 | End: 2020-03-24

## 2019-08-20 RX ORDER — VENLAFAXINE HYDROCHLORIDE 150 MG/1
150 CAPSULE, EXTENDED RELEASE ORAL DAILY
Qty: 90 CAPSULE | Refills: 1 | OUTPATIENT
Start: 2019-08-20

## 2019-08-20 RX ORDER — LEVOTHYROXINE SODIUM 112 UG/1
112 TABLET ORAL DAILY
Qty: 90 TABLET | Refills: 3 | OUTPATIENT
Start: 2019-08-20

## 2019-08-20 RX ORDER — PRAVASTATIN SODIUM 20 MG
20 TABLET ORAL DAILY
Qty: 90 TABLET | Refills: 1 | OUTPATIENT
Start: 2019-08-20

## 2019-08-20 RX ORDER — PRAVASTATIN SODIUM 20 MG
20 TABLET ORAL DAILY
Qty: 30 TABLET | Refills: 0 | Status: SHIPPED | OUTPATIENT
Start: 2019-08-20 | End: 2019-08-28

## 2019-08-20 NOTE — TELEPHONE ENCOUNTER
Pending Prescriptions:                       Disp   Refills    pravastatin (PRAVACHOL) 20 MG tablet      30 tab*0            Sig: Take 1 tablet (20 mg) by mouth daily    levothyroxine (SYNTHROID/LEVOTHROID) 112 *30 tab*0            Sig: Take 1 tablet (112 mcg) by mouth daily    Pt is due or a FASTING OV  Pt was notified of needing fasting ov through my chart. (see previous notes)  Please fax and close encounter  Katiuska

## 2019-08-20 NOTE — TELEPHONE ENCOUNTER
Refused Prescriptions:                       Disp   Refills    levothyroxine (SYNTHROID/LEVOTHROID) 112 M*90 tab*3        Sig: Take 1 tablet (112 mcg) by mouth daily  Refused By: KATIUSKA CASTELLANOS  Reason for Refusal: Patient needs appointment    pravastatin (PRAVACHOL) 20 MG tablet       90 tab*1        Sig: Take 1 tablet (20 mg) by mouth daily  Refused By: KATIUSKA CASTELLANOS  Reason for Refusal: Patient needs appointment    venlafaxine (EFFEXOR-XR) 150 MG 24 hr caps*90 cap*1        Sig: Take 1 capsule (150 mg) by mouth daily  Refused By: KATIUSKA CASTELLANOS  Reason for Refusal: Patient needs appointment    This was mail order denied  Last med check was 2-1-2019 rtc in 3 months due in May/June 2019  Sent pt a my chart message due for a FASTING OV   Katiuska  [phone] (home)

## 2019-08-28 ENCOUNTER — OFFICE VISIT (OUTPATIENT)
Dept: FAMILY MEDICINE | Facility: CLINIC | Age: 54
End: 2019-08-28

## 2019-08-28 VITALS
WEIGHT: 203 LBS | DIASTOLIC BLOOD PRESSURE: 84 MMHG | OXYGEN SATURATION: 98 % | HEART RATE: 64 BPM | SYSTOLIC BLOOD PRESSURE: 126 MMHG | BODY MASS INDEX: 30.07 KG/M2 | HEIGHT: 69 IN | TEMPERATURE: 98.5 F

## 2019-08-28 DIAGNOSIS — E78.5 HYPERLIPIDEMIA LDL GOAL <130: ICD-10-CM

## 2019-08-28 DIAGNOSIS — R73.09 ABNORMAL GLUCOSE: ICD-10-CM

## 2019-08-28 DIAGNOSIS — E06.3 HASHIMOTO'S THYROIDITIS: ICD-10-CM

## 2019-08-28 DIAGNOSIS — F41.1 GAD (GENERALIZED ANXIETY DISORDER): ICD-10-CM

## 2019-08-28 DIAGNOSIS — R53.82 CHRONIC FATIGUE: ICD-10-CM

## 2019-08-28 DIAGNOSIS — R53.83 OTHER FATIGUE: ICD-10-CM

## 2019-08-28 DIAGNOSIS — R53.83 FATIGUE, UNSPECIFIED TYPE: Primary | ICD-10-CM

## 2019-08-28 LAB
ALBUMIN SERPL-MCNC: 4.3 G/DL (ref 3.6–5.1)
ALBUMIN/GLOB SERPL: 1.7 {RATIO} (ref 1–2.5)
ALP SERPL-CCNC: 62 U/L (ref 33–130)
ALT 1742-6: 20 U/L (ref 5–30)
AST 1920-8: 23 U/L (ref 7–31)
BILIRUB SERPL-MCNC: 0.7 MG/DL (ref 0.2–1.2)
BUN SERPL-MCNC: 14 MG/DL (ref 7–25)
BUN/CREATININE RATIO: 11.9 (ref 6–22)
CALCIUM SERPL-MCNC: 9.9 MG/DL (ref 8.6–10.3)
CHLORIDE SERPLBLD-SCNC: 105.8 MMOL/L (ref 98–110)
CHOLEST SERPL-MCNC: 216 MG/DL (ref 0–199)
CHOLEST/HDLC SERPL: 3 {RATIO} (ref 0–5)
CO2 SERPL-SCNC: 25.9 MMOL/L (ref 20–32)
CREAT SERPL-MCNC: 1.18 MG/DL (ref 0.7–1.18)
GLOBULIN, CALCULATED - QUEST: 2.6 (ref 1.9–3.7)
GLUCOSE SERPL-MCNC: 109 MG/DL (ref 60–99)
HDLC SERPL-MCNC: 62 MG/DL (ref 40–150)
LDLC SERPL CALC-MCNC: 133 MG/DL (ref 0–130)
POTASSIUM SERPL-SCNC: 5.13 MMOL/L (ref 3.5–5.3)
PROT SERPL-MCNC: 6.9 G/DL (ref 6.1–8.1)
SODIUM SERPL-SCNC: 140.8 MMOL/L (ref 135–146)
TRIGL SERPL-MCNC: 104 MG/DL (ref 0–149)

## 2019-08-28 PROCEDURE — 82607 VITAMIN B-12: CPT | Mod: 90 | Performed by: PHYSICIAN ASSISTANT

## 2019-08-28 PROCEDURE — 80053 COMPREHEN METABOLIC PANEL: CPT | Performed by: PHYSICIAN ASSISTANT

## 2019-08-28 PROCEDURE — 80061 LIPID PANEL: CPT | Performed by: PHYSICIAN ASSISTANT

## 2019-08-28 PROCEDURE — 83036 HEMOGLOBIN GLYCOSYLATED A1C: CPT | Performed by: PHYSICIAN ASSISTANT

## 2019-08-28 PROCEDURE — 36415 COLL VENOUS BLD VENIPUNCTURE: CPT | Performed by: PHYSICIAN ASSISTANT

## 2019-08-28 PROCEDURE — 99214 OFFICE O/P EST MOD 30 MIN: CPT | Performed by: PHYSICIAN ASSISTANT

## 2019-08-28 RX ORDER — VENLAFAXINE HYDROCHLORIDE 150 MG/1
150 CAPSULE, EXTENDED RELEASE ORAL DAILY
Qty: 90 CAPSULE | Refills: 1 | Status: SHIPPED | OUTPATIENT
Start: 2019-08-28 | End: 2020-03-06

## 2019-08-28 RX ORDER — PRAVASTATIN SODIUM 20 MG
20 TABLET ORAL DAILY
Qty: 90 TABLET | Refills: 1 | Status: SHIPPED | OUTPATIENT
Start: 2019-08-28 | End: 2020-03-24

## 2019-08-28 RX ORDER — LEVOTHYROXINE SODIUM 112 UG/1
112 TABLET ORAL DAILY
Qty: 90 TABLET | Refills: 1 | Status: CANCELLED | OUTPATIENT
Start: 2019-08-28

## 2019-08-28 ASSESSMENT — ANXIETY QUESTIONNAIRES
1. FEELING NERVOUS, ANXIOUS, OR ON EDGE: NOT AT ALL
IF YOU CHECKED OFF ANY PROBLEMS ON THIS QUESTIONNAIRE, HOW DIFFICULT HAVE THESE PROBLEMS MADE IT FOR YOU TO DO YOUR WORK, TAKE CARE OF THINGS AT HOME, OR GET ALONG WITH OTHER PEOPLE: NOT DIFFICULT AT ALL
2. NOT BEING ABLE TO STOP OR CONTROL WORRYING: NOT AT ALL
GAD7 TOTAL SCORE: 0
6. BECOMING EASILY ANNOYED OR IRRITABLE: NOT AT ALL
3. WORRYING TOO MUCH ABOUT DIFFERENT THINGS: NOT AT ALL
5. BEING SO RESTLESS THAT IT IS HARD TO SIT STILL: NOT AT ALL
7. FEELING AFRAID AS IF SOMETHING AWFUL MIGHT HAPPEN: NOT AT ALL

## 2019-08-28 ASSESSMENT — PATIENT HEALTH QUESTIONNAIRE - PHQ9
SUM OF ALL RESPONSES TO PHQ QUESTIONS 1-9: 5
5. POOR APPETITE OR OVEREATING: NOT AT ALL

## 2019-08-28 ASSESSMENT — MIFFLIN-ST. JEOR: SCORE: 1738.24

## 2019-08-28 NOTE — PROGRESS NOTES
"CC: Medication Check    History:  MIKE: Takes Effexor  mg daily. Changing dose of this did not make any difference in fatigue so would like to stay on regular dose. Denies any side effects    Hypothyroid: Takes levothyroxine 112 mcg daily. Takes right away in the morning. Denies any side effects.     Hyperlipidemia: Takes pravastatin 20 mg daily. Tolerates this well without side effects.     PMH, MEDICATIONS, ALLERGIES, SOCIAL AND FAMILY HISTORY in [facility] and reviewed by me personally.      ROS negative other than the symptoms noted above in the HPI.        Examination   /84 (BP Location: Left arm, Patient Position: Sitting, Cuff Size: Adult Large)   Pulse 64   Temp 98.5  F (36.9  C) (Oral)   Ht 1.74 m (5' 8.5\")   Wt 92.1 kg (203 lb)   SpO2 98%   BMI 30.42 kg/m         Constitutional: Sitting comfortably, in no acute distress. Vital signs noted  Eyes: pupils equal round reactive to light and accomodation, extra ocular movements intact  Neck:  no adenopathy, trachea midline and normal to palpation  Cardiovascular:  regular rate and rhythm, no murmurs, clicks, or gallops  Respiratory:  normal respiratory rate and rhythm, lungs clear to auscultation  SKIN: No jaundice/pallor/rash.   Psychiatric: mentation appears normal and affect normal/bright        A/P    ICD-10-CM    1. Fatigue, unspecified type R53.83 VITAMIN B12 ([facility])   2. Hashimoto's thyroiditis E06.3 VENOUS COLLECTION   3. Hyperlipidemia LDL goal <130 E78.5 pravastatin (PRAVACHOL) 20 MG tablet     Lipid Panel ([facility])     Comprehensive Metobolic Panel ([facility])   4. MIKE (generalized anxiety disorder) F41.1 venlafaxine (EFFEXOR-XR) 150 MG 24 hr capsule   5. Other fatigue R53.83        DISCUSSION:  MIKE: Refilled at same dose for 6 months. Will check vitamin B12 as further work-up for fatigue. He is due for CPAP annual check in the next couple months.    Hypothyroid: Will recheck TSH/T4 and [facility] when available. Refilled for 1 " year.    Hyperlipidemia: Will recheck fasting labs today, and send MyChart with results when available. Refilled for 6 months.    follow up visit: 6 months    Alberta Singh PA-C  [facility]

## 2019-08-28 NOTE — NURSING NOTE
Emerson is here for a fasting med check.          Pre-visit Screening:  Immunizations:  up to date  Colonoscopy:  is up to date  Mammogram: NA  Asthma Action Test/Plan:  NA  PHQ9:  Done today  GAD7:  Done today  Questioned patient about current smoking habits Pt. has never smoked.  Ok to leave detailed message on voice mail for today's visit only Yes, phone # [phone]

## 2019-08-29 LAB — VIT B12 SERPL-MCNC: 570 PG/ML (ref 200–1100)

## 2019-08-29 ASSESSMENT — ANXIETY QUESTIONNAIRES: GAD7 TOTAL SCORE: 0

## 2019-08-30 LAB — HBA1C MFR BLD: 5.2 % (ref 4–7)

## 2020-02-25 DIAGNOSIS — E06.3 HASHIMOTO'S THYROIDITIS: ICD-10-CM

## 2020-02-25 RX ORDER — LEVOTHYROXINE SODIUM 112 UG/1
112 TABLET ORAL DAILY
Qty: 90 TABLET | Refills: 1 | COMMUNITY
Start: 2020-02-25

## 2020-02-25 NOTE — TELEPHONE ENCOUNTER
Denied levothyroxine as pt last seen 8/2019 and needs office visit now.  [facility] pharmacy notified

## 2020-03-06 ENCOUNTER — TELEPHONE (OUTPATIENT)
Dept: FAMILY MEDICINE | Facility: CLINIC | Age: 55
End: 2020-03-06

## 2020-03-06 DIAGNOSIS — F41.1 GAD (GENERALIZED ANXIETY DISORDER): ICD-10-CM

## 2020-03-06 RX ORDER — VENLAFAXINE HYDROCHLORIDE 150 MG/1
150 CAPSULE, EXTENDED RELEASE ORAL DAILY
Qty: 20 CAPSULE | Refills: 0 | COMMUNITY
Start: 2020-03-06 | End: 2020-06-26 | Stop reason: ALTCHOICE

## 2020-03-06 NOTE — TELEPHONE ENCOUNTER
Pt is scheduled for fasting CPX on 3/24/20 8 am, and will be out of Effexor, asking for 30 day refill.     Routing to [place], as [facility] is out of the office.     Pharmacy is [facility]

## 2020-03-11 ENCOUNTER — HEALTH MAINTENANCE LETTER (OUTPATIENT)
Age: 55
End: 2020-03-11

## 2020-03-23 ENCOUNTER — MYC MEDICAL ADVICE (OUTPATIENT)
Dept: FAMILY MEDICINE | Facility: CLINIC | Age: 55
End: 2020-03-23

## 2020-03-23 NOTE — TELEPHONE ENCOUNTER
Canceled his CPX, but is due for medication check. Per Alberta can do a virtual visit, sent [employer] with MIKE attached.

## 2020-03-24 ENCOUNTER — OFFICE VISIT (OUTPATIENT)
Dept: FAMILY MEDICINE | Facility: CLINIC | Age: 55
End: 2020-03-24

## 2020-03-24 DIAGNOSIS — E78.5 HYPERLIPIDEMIA LDL GOAL <130: ICD-10-CM

## 2020-03-24 DIAGNOSIS — F41.1 GAD (GENERALIZED ANXIETY DISORDER): Primary | ICD-10-CM

## 2020-03-24 DIAGNOSIS — E06.3 HASHIMOTO'S THYROIDITIS: ICD-10-CM

## 2020-03-24 PROCEDURE — 99212 OFFICE O/P EST SF 10 MIN: CPT | Mod: 95 | Performed by: PHYSICIAN ASSISTANT

## 2020-03-24 RX ORDER — VENLAFAXINE HYDROCHLORIDE 75 MG/1
75 CAPSULE, EXTENDED RELEASE ORAL DAILY
Qty: 30 CAPSULE | Refills: 0 | Status: SHIPPED | OUTPATIENT
Start: 2020-03-24 | End: 2020-06-26 | Stop reason: ALTCHOICE

## 2020-03-24 RX ORDER — PRAVASTATIN SODIUM 20 MG
20 TABLET ORAL DAILY
Qty: 90 TABLET | Refills: 0 | Status: SHIPPED | OUTPATIENT
Start: 2020-03-24 | End: 2020-06-26

## 2020-03-24 RX ORDER — FLUOXETINE 10 MG/1
CAPSULE ORAL
Qty: 60 CAPSULE | Refills: 1 | Status: SHIPPED | OUTPATIENT
Start: 2020-03-24 | End: 2020-12-29

## 2020-03-24 RX ORDER — LEVOTHYROXINE SODIUM 112 UG/1
112 TABLET ORAL DAILY
Qty: 90 TABLET | Refills: 0 | Status: SHIPPED | OUTPATIENT
Start: 2020-03-24 | End: 2020-06-26

## 2020-03-24 ASSESSMENT — ANXIETY QUESTIONNAIRES
6. BECOMING EASILY ANNOYED OR IRRITABLE: SEVERAL DAYS
1. FEELING NERVOUS, ANXIOUS, OR ON EDGE: NOT AT ALL
IF YOU CHECKED OFF ANY PROBLEMS ON THIS QUESTIONNAIRE, HOW DIFFICULT HAVE THESE PROBLEMS MADE IT FOR YOU TO DO YOUR WORK, TAKE CARE OF THINGS AT HOME, OR GET ALONG WITH OTHER PEOPLE: NOT DIFFICULT AT ALL
2. NOT BEING ABLE TO STOP OR CONTROL WORRYING: NOT AT ALL
GAD7 TOTAL SCORE: 2
4. TROUBLE RELAXING: NOT AT ALL
5. BEING SO RESTLESS THAT IT IS HARD TO SIT STILL: NOT AT ALL
7. FEELING AFRAID AS IF SOMETHING AWFUL MIGHT HAPPEN: NOT AT ALL
3. WORRYING TOO MUCH ABOUT DIFFERENT THINGS: SEVERAL DAYS

## 2020-03-24 NOTE — PROGRESS NOTES
Visit conducted using Zoom video conference given concerns with CarolineidMelissa    CC: Medication Check    History:  Hypothyroid: Takes levothyroxine 112 mcg daily. Is taking this consistently.     Cholesterol: Takes pravastatin daily.     Anxiety: Takes Effexor  mg daily for anxiety. He continues to have low energy that he feels somewhat correlate with when this was started. Has been on Paxil and Lexapro in the past, but nothing else. Has been trying to do regular exercise, and has been following more healthy diet since January. We did try alternating dosing Effexor all the way down to 75 mg daily and up to 225 mg, without effect of energy.    PMH, MEDICATIONS, ALLERGIES, SOCIAL AND FAMILY HISTORY in [facility] and reviewed by me personally.    ROS negative other than the symptoms noted above in the HPI.    Examination   There were no vitals taken for this visit.       Constitutional: Sitting comfortably, in no acute distress. Vital signs noted  Psychiatric: mentation appears normal and affect normal/bright        A/P    ICD-10-CM    1. JATIN (generalized anxiety disorder)  F41.1 venlafaxine (EFFEXOR-XR) 75 MG 24 hr capsule     FLUoxetine (PROZAC) 10 MG capsule   2. Hashimoto's thyroiditis  E06.3 levothyroxine (SYNTHROID/LEVOTHROID) 112 MCG tablet   3. Hyperlipidemia LDL goal <130  E78.5 pravastatin (PRAVACHOL) 20 MG tablet       DISCUSSION:  Anxiety: Jatin-7 score today is 2. Given patient's concerns that his fatigue is from Effexor, will do cross taper to fluoxetine, which has not been tried. Take lower dose of Effexor (75 mg) daily for 2 weeks, and also take fluoxetine 10 mg daily. Then stop Effexor completely and increase fluoxetine to 2 tablets daily. Warned of side effects, and asked him to contact me if noted. Would consider referral to endocrinology if energy levels continue to be a problem    Hypothyroid and hyperlipidemia: Continue on current dose of levothyroxine and pravastatin. Refilled for 90 days. Will  coordinate appt at that time, based on Covid status.     follow up visit: 3 months based on covid update. Due for OV to discuss med change, and labs.     Alberta Singh PA-C  [facility]

## 2020-03-25 ASSESSMENT — ANXIETY QUESTIONNAIRES: GAD7 TOTAL SCORE: 2

## 2020-05-14 ENCOUNTER — TELEPHONE (OUTPATIENT)
Dept: FAMILY MEDICINE | Facility: CLINIC | Age: 55
End: 2020-05-14

## 2020-06-20 ENCOUNTER — MYC REFILL (OUTPATIENT)
Dept: FAMILY MEDICINE | Facility: CLINIC | Age: 55
End: 2020-06-20

## 2020-06-20 DIAGNOSIS — F41.1 GAD (GENERALIZED ANXIETY DISORDER): ICD-10-CM

## 2020-06-20 RX ORDER — FLUOXETINE 10 MG/1
CAPSULE ORAL
Qty: 60 CAPSULE | Refills: 1 | Status: CANCELLED | OUTPATIENT
Start: 2020-06-20

## 2020-06-26 ENCOUNTER — OFFICE VISIT (OUTPATIENT)
Dept: FAMILY MEDICINE | Facility: CLINIC | Age: 55
End: 2020-06-26

## 2020-06-26 VITALS
SYSTOLIC BLOOD PRESSURE: 120 MMHG | RESPIRATION RATE: 20 BRPM | DIASTOLIC BLOOD PRESSURE: 80 MMHG | TEMPERATURE: 97 F | HEIGHT: 69 IN | HEART RATE: 60 BPM | BODY MASS INDEX: 29.15 KG/M2 | WEIGHT: 196.8 LBS

## 2020-06-26 DIAGNOSIS — E78.5 HYPERLIPIDEMIA LDL GOAL <130: ICD-10-CM

## 2020-06-26 DIAGNOSIS — G47.33 OSA (OBSTRUCTIVE SLEEP APNEA): ICD-10-CM

## 2020-06-26 DIAGNOSIS — F41.1 GAD (GENERALIZED ANXIETY DISORDER): Primary | ICD-10-CM

## 2020-06-26 DIAGNOSIS — E06.3 HASHIMOTO'S THYROIDITIS: ICD-10-CM

## 2020-06-26 LAB
ALBUMIN SERPL-MCNC: 4.2 G/DL (ref 3.6–5.1)
ALBUMIN/GLOB SERPL: 1.8 {RATIO} (ref 1–2.5)
ALP SERPL-CCNC: 53 U/L (ref 33–130)
ALT 1742-6: 11 U/L (ref 0–32)
AST 1920-8: 17 U/L (ref 0–35)
BILIRUB SERPL-MCNC: 0.8 MG/DL (ref 0.2–1.2)
BUN SERPL-MCNC: 16 MG/DL (ref 7–25)
BUN/CREATININE RATIO: 13.7 (ref 6–22)
CALCIUM SERPL-MCNC: 9.4 MG/DL (ref 8.6–10.3)
CHLORIDE SERPLBLD-SCNC: 107 MMOL/L (ref 98–110)
CHOLEST SERPL-MCNC: 208 MG/DL (ref 0–199)
CHOLEST/HDLC SERPL: 3 {RATIO} (ref 0–5)
CO2 SERPL-SCNC: 26.8 MMOL/L (ref 20–32)
CREAT SERPL-MCNC: 1.17 MG/DL (ref 0.7–1.18)
GLOBULIN, CALCULATED - QUEST: 2.3 (ref 1.9–3.7)
GLUCOSE SERPL-MCNC: 102 MG/DL (ref 60–99)
HDLC SERPL-MCNC: 75 MG/DL (ref 40–150)
LDLC SERPL CALC-MCNC: 120 MG/DL (ref 0–130)
POTASSIUM SERPL-SCNC: 4.79 MMOL/L (ref 3.5–5.3)
PROT SERPL-MCNC: 6.5 G/DL (ref 6.1–8.1)
SODIUM SERPL-SCNC: 140.8 MMOL/L (ref 135–146)
TRIGL SERPL-MCNC: 63 MG/DL (ref 0–149)

## 2020-06-26 PROCEDURE — 80053 COMPREHEN METABOLIC PANEL: CPT | Performed by: PHYSICIAN ASSISTANT

## 2020-06-26 PROCEDURE — 84443 ASSAY THYROID STIM HORMONE: CPT | Mod: 90 | Performed by: PHYSICIAN ASSISTANT

## 2020-06-26 PROCEDURE — 80061 LIPID PANEL: CPT | Performed by: PHYSICIAN ASSISTANT

## 2020-06-26 PROCEDURE — 36415 COLL VENOUS BLD VENIPUNCTURE: CPT | Performed by: PHYSICIAN ASSISTANT

## 2020-06-26 PROCEDURE — 99213 OFFICE O/P EST LOW 20 MIN: CPT | Performed by: PHYSICIAN ASSISTANT

## 2020-06-26 RX ORDER — LEVOTHYROXINE SODIUM 112 UG/1
112 TABLET ORAL DAILY
Qty: 90 TABLET | Refills: 3 | Status: SHIPPED | OUTPATIENT
Start: 2020-06-26 | End: 2021-06-24

## 2020-06-26 RX ORDER — PRAVASTATIN SODIUM 20 MG
20 TABLET ORAL DAILY
Qty: 90 TABLET | Refills: 1 | Status: SHIPPED | OUTPATIENT
Start: 2020-06-26 | End: 2020-12-29

## 2020-06-26 ASSESSMENT — ANXIETY QUESTIONNAIRES
1. FEELING NERVOUS, ANXIOUS, OR ON EDGE: NOT AT ALL
GAD7 TOTAL SCORE: 2
5. BEING SO RESTLESS THAT IT IS HARD TO SIT STILL: NOT AT ALL
2. NOT BEING ABLE TO STOP OR CONTROL WORRYING: NOT AT ALL
3. WORRYING TOO MUCH ABOUT DIFFERENT THINGS: NOT AT ALL
7. FEELING AFRAID AS IF SOMETHING AWFUL MIGHT HAPPEN: NOT AT ALL
6. BECOMING EASILY ANNOYED OR IRRITABLE: SEVERAL DAYS
IF YOU CHECKED OFF ANY PROBLEMS ON THIS QUESTIONNAIRE, HOW DIFFICULT HAVE THESE PROBLEMS MADE IT FOR YOU TO DO YOUR WORK, TAKE CARE OF THINGS AT HOME, OR GET ALONG WITH OTHER PEOPLE: NOT DIFFICULT AT ALL

## 2020-06-26 ASSESSMENT — MIFFLIN-ST. JEOR: SCORE: 1710.12

## 2020-06-26 ASSESSMENT — PATIENT HEALTH QUESTIONNAIRE - PHQ9
SUM OF ALL RESPONSES TO PHQ QUESTIONS 1-9: 7
5. POOR APPETITE OR OVEREATING: SEVERAL DAYS

## 2020-06-26 NOTE — PROGRESS NOTES
"CC: Medication Check    History:  Anxiety: Was last here for this issue. 3/2020 was here and we felt that Effexor may have been causing fatigue, so we decided to cross taper to fluoxetine. Is having some trouble falling asleep and staying asleep.     Hypothyroidism: Takes levothyroxine 112 mcg daily. No concerns of being under or over supplement.     Pravastatin: Taking daily. No side effects.     PMH, MEDICATIONS, ALLERGIES, SOCIAL AND FAMILY HISTORY in [facility] and reviewed by me personally.    ROS negative other than the symptoms noted above in the HPI.      Examination   /80 (BP Location: Left arm, Patient Position: Chair, Cuff Size: Adult Regular)   Pulse 60   Temp 97  F (36.1  C)   Resp 20   Ht 1.74 m (5' 8.5\")   Wt 89.3 kg (196 lb 12.8 oz)   BMI 29.49 kg/m       Constitutional: Sitting comfortably, in no acute distress. Vital signs noted  Neck:  no adenopathy, trachea midline and normal to palpation  Cardiovascular:  regular rate and rhythm, no murmurs, clicks, or gallops  Respiratory:  normal respiratory rate and rhythm, lungs clear to auscultation  SKIN: No jaundice/pallor/rash.   Psychiatric: mentation appears normal and affect normal/bright    A/P    ICD-10-CM    1. MIKE (generalized anxiety disorder)  F41.1 FLUoxetine (PROZAC) 20 MG capsule   2. Hyperlipidemia LDL goal <130  E78.5 pravastatin (PRAVACHOL) 20 MG tablet     Lipid Panel ([facility])     Comprehensive Metobolic Panel ([facility])     VENOUS COLLECTION   3. Hashimoto's thyroiditis  E06.3 levothyroxine (SYNTHROID/LEVOTHROID) 112 MCG tablet     TSH with free T4 reflex ([facility])     VENOUS COLLECTION   4. CHELSEA (obstructive sleep apnea)  G47.33      DISCUSSION:  Anxiety: Okay to continue fluoxetine at 10 mg daily. Will refill for 6 months.    Hypothyroid: Will recheck TSH today, and send MyChart with results. Will refill levothyroxine at same dose for 1 year.     Pravastatin: Will check fasting labs, and send [employer] with results. Refilled for 6 months. " Could consider increasing dose based on results.     follow up visit: 6 months    Alberta Singh PA-C  [facility]

## 2020-06-26 NOTE — NURSING NOTE
Claude Dorsey is here for fasting blood work and medication refill.  Questioned patient about current smoking habits.  Pt. has never smoked.  Body mass index is 33.67 kg/(m^2).  PULSE regular  My Chart: active    Pre-visit planning  Immunizations - up to date  Colonoscopy - is up to date  Mammogram -   Asthma -   PHQ9  MIKE-7- completed today

## 2020-06-27 LAB — TSH SERPL-ACNC: 2.07 MIU/L (ref 0.4–4.5)

## 2020-06-27 ASSESSMENT — ANXIETY QUESTIONNAIRES: GAD7 TOTAL SCORE: 2

## 2020-08-06 ENCOUNTER — TRANSFERRED RECORDS (OUTPATIENT)
Dept: FAMILY MEDICINE | Facility: CLINIC | Age: 55
End: 2020-08-06

## 2020-09-18 ENCOUNTER — TRANSFERRED RECORDS (OUTPATIENT)
Dept: FAMILY MEDICINE | Facility: CLINIC | Age: 55
End: 2020-09-18

## 2020-12-21 NOTE — TELEPHONE ENCOUNTER
Denied refill request for fluoxetine and pravastatin. Pt is due for a fasting OV per notes on 6/26/2020.

## 2020-12-27 ENCOUNTER — HEALTH MAINTENANCE LETTER (OUTPATIENT)
Age: 55
End: 2020-12-27

## 2020-12-29 ENCOUNTER — OFFICE VISIT (OUTPATIENT)
Dept: FAMILY MEDICINE | Facility: CLINIC | Age: 55
End: 2020-12-29

## 2020-12-29 VITALS
BODY MASS INDEX: 29.71 KG/M2 | HEART RATE: 68 BPM | OXYGEN SATURATION: 97 % | DIASTOLIC BLOOD PRESSURE: 80 MMHG | WEIGHT: 200.6 LBS | HEIGHT: 69 IN | SYSTOLIC BLOOD PRESSURE: 118 MMHG | TEMPERATURE: 98.2 F

## 2020-12-29 DIAGNOSIS — Z23 NEED FOR VACCINATION: ICD-10-CM

## 2020-12-29 DIAGNOSIS — F41.1 GAD (GENERALIZED ANXIETY DISORDER): Primary | ICD-10-CM

## 2020-12-29 DIAGNOSIS — E78.5 HYPERLIPIDEMIA LDL GOAL <130: ICD-10-CM

## 2020-12-29 DIAGNOSIS — Z12.5 SCREENING FOR PROSTATE CANCER: ICD-10-CM

## 2020-12-29 LAB
ALBUMIN SERPL-MCNC: 4 G/DL (ref 3.6–5.1)
ALBUMIN/GLOB SERPL: 1.7 {RATIO} (ref 1–2.5)
ALP SERPL-CCNC: 56 U/L (ref 33–130)
ALT 1742-6: 10 U/L (ref 0–32)
AST 1920-8: 13 U/L (ref 0–35)
BILIRUB SERPL-MCNC: 1 MG/DL (ref 0.2–1.2)
BUN SERPL-MCNC: 11 MG/DL (ref 7–25)
BUN/CREATININE RATIO: 10.9 (ref 6–22)
CALCIUM SERPL-MCNC: 9 MG/DL (ref 8.6–10.3)
CHLORIDE SERPLBLD-SCNC: 105.5 MMOL/L (ref 98–110)
CHOLEST SERPL-MCNC: 207 MG/DL (ref 0–199)
CHOLEST/HDLC SERPL: 3 {RATIO} (ref 0–5)
CO2 SERPL-SCNC: 28 MMOL/L (ref 20–32)
CREAT SERPL-MCNC: 1.01 MG/DL (ref 0.7–1.18)
GLOBULIN, CALCULATED - QUEST: 2.3 (ref 1.9–3.7)
GLUCOSE SERPL-MCNC: 103 MG/DL (ref 60–99)
HDLC SERPL-MCNC: 73 MG/DL (ref 40–150)
LDLC SERPL CALC-MCNC: 123 MG/DL (ref 0–130)
POTASSIUM SERPL-SCNC: 5.11 MMOL/L (ref 3.5–5.3)
PROT SERPL-MCNC: 6.3 G/DL (ref 6.1–8.1)
SODIUM SERPL-SCNC: 138.7 MMOL/L (ref 135–146)
TRIGL SERPL-MCNC: 56 MG/DL (ref 0–149)

## 2020-12-29 PROCEDURE — 90471 IMMUNIZATION ADMIN: CPT | Performed by: PHYSICIAN ASSISTANT

## 2020-12-29 PROCEDURE — 99213 OFFICE O/P EST LOW 20 MIN: CPT | Mod: 25 | Performed by: PHYSICIAN ASSISTANT

## 2020-12-29 PROCEDURE — 84153 ASSAY OF PSA TOTAL: CPT | Performed by: PHYSICIAN ASSISTANT

## 2020-12-29 PROCEDURE — 80053 COMPREHEN METABOLIC PANEL: CPT | Performed by: PHYSICIAN ASSISTANT

## 2020-12-29 PROCEDURE — 36415 COLL VENOUS BLD VENIPUNCTURE: CPT | Performed by: PHYSICIAN ASSISTANT

## 2020-12-29 PROCEDURE — 90686 IIV4 VACC NO PRSV 0.5 ML IM: CPT | Performed by: PHYSICIAN ASSISTANT

## 2020-12-29 PROCEDURE — 80061 LIPID PANEL: CPT | Performed by: PHYSICIAN ASSISTANT

## 2020-12-29 RX ORDER — PRAVASTATIN SODIUM 20 MG
20 TABLET ORAL DAILY
Qty: 90 TABLET | Refills: 1 | Status: SHIPPED | OUTPATIENT
Start: 2020-12-29 | End: 2021-06-24

## 2020-12-29 ASSESSMENT — ANXIETY QUESTIONNAIRES
1. FEELING NERVOUS, ANXIOUS, OR ON EDGE: NOT AT ALL
7. FEELING AFRAID AS IF SOMETHING AWFUL MIGHT HAPPEN: NOT AT ALL
GAD7 TOTAL SCORE: 1
6. BECOMING EASILY ANNOYED OR IRRITABLE: NOT AT ALL
3. WORRYING TOO MUCH ABOUT DIFFERENT THINGS: SEVERAL DAYS
5. BEING SO RESTLESS THAT IT IS HARD TO SIT STILL: NOT AT ALL
2. NOT BEING ABLE TO STOP OR CONTROL WORRYING: NOT AT ALL
IF YOU CHECKED OFF ANY PROBLEMS ON THIS QUESTIONNAIRE, HOW DIFFICULT HAVE THESE PROBLEMS MADE IT FOR YOU TO DO YOUR WORK, TAKE CARE OF THINGS AT HOME, OR GET ALONG WITH OTHER PEOPLE: NOT DIFFICULT AT ALL

## 2020-12-29 ASSESSMENT — PATIENT HEALTH QUESTIONNAIRE - PHQ9
5. POOR APPETITE OR OVEREATING: NOT AT ALL
SUM OF ALL RESPONSES TO PHQ QUESTIONS 1-9: 8

## 2020-12-29 ASSESSMENT — MIFFLIN-ST. JEOR: SCORE: 1722.36

## 2020-12-29 NOTE — NURSING NOTE
Emerson is here for fasting med check    Pre-visit Screening:  Immunizations:  Flu shot today  Colonoscopy:  is completed today  Mammogram: NA  Asthma Action Test/Plan:  NA  PHQ9:  Done today  GAD7:  Done today  Questioned patient about current smoking habits Pt. has never smoked.  Ok to leave detailed message on voice mail for today's visit only Yes, phone # [phone]

## 2020-12-29 NOTE — PROGRESS NOTES
"CC: Medication Check    History:  Anxiety, irritability:  Takes fluoxetine 20 mg daily. No side effects. Did not see any drastic improvement with this dose increase. Main issue is daytime somnolence.    Mixed Hyperlipidemia:  Takes pravastatin 20 mg daily. No side effects.    CHELSEA:  Continues to work with pulmonary, but has had trouble having CPAP adjusted.     PMH, MEDICATIONS, ALLERGIES, SOCIAL AND FAMILY HISTORY in [facility] and reviewed by me personally.      ROS negative other than the symptoms noted above in the HPI.      Examination   /80 (BP Location: Right arm, Patient Position: Sitting, Cuff Size: Adult Large)   Pulse 68   Temp 98.2  F (36.8  C) (Oral)   Ht 1.74 m (5' 8.5\")   Wt 91 kg (200 lb 9.6 oz)   SpO2 97%   BMI 30.06 kg/m         Constitutional: Sitting comfortably, in no acute distress. Vital signs noted  Eyes: pupils equal round reactive to light and accomodation, extra ocular movements intact  Neck:  no adenopathy, trachea midline and normal to palpation, thyroid normal to palpation  Cardiovascular:  regular rate and rhythm, no murmurs, clicks, or gallops  Respiratory:  normal respiratory rate and rhythm, lungs clear to auscultation  SKIN: No jaundice/pallor/rash.   Psychiatric: mentation appears normal and affect normal/bright      A/P    ICD-10-CM    1. MIKE (generalized anxiety disorder)  F41.1 FLUoxetine (PROZAC) 20 MG capsule   2. Hyperlipidemia LDL goal <130  E78.5 pravastatin (PRAVACHOL) 20 MG tablet   3. Need for vaccination  Z23 FLU VAC PRESRV FREE QUAD SPLIT VIR 3+YRS IM       DISCUSSION:  Anxiety, irritability:  MIKE/PHQ today show good control. Agreed to refill at the same dose for 6 months. If still going well at next 6 month appt, can consider yearly medication refills.     Mixed Hyperlipidemia:  Will update fasting labs, and send MyChart with results. Refilled for 6 months.     CHELSEA:  Offered referral for 2nd opinion, but will wait until after pandemic.    Recommended " colonoscopy. Getting baseline PSA today- no FH, symptoms.     follow up visit: 6 months    Alberta Singh PA-C  [facility]

## 2020-12-30 LAB — ABBOTT PSA - QUEST: 0.6 NG/ML

## 2020-12-30 ASSESSMENT — ANXIETY QUESTIONNAIRES: GAD7 TOTAL SCORE: 1

## 2021-02-19 ENCOUNTER — TRANSFERRED RECORDS (OUTPATIENT)
Dept: FAMILY MEDICINE | Facility: CLINIC | Age: 56
End: 2021-02-19

## 2021-04-25 ENCOUNTER — HEALTH MAINTENANCE LETTER (OUTPATIENT)
Age: 56
End: 2021-04-25

## 2021-06-15 NOTE — TELEPHONE ENCOUNTER
Denied refill request for levothyroxine. Pt is due for yearly thyroid check and labs.  Can call in an extension of medication once an appointment has been scheduled.  Will send a [employer]Â® message to inform pt.

## 2021-06-17 NOTE — TELEPHONE ENCOUNTER
Denied pravastatin and fluoxetine. Pt is due for OV for all meds. I called and left a detailed voicemail to inform.  Can call in an extension of medication once an appointment has been scheduled.

## 2021-06-24 ENCOUNTER — OFFICE VISIT (OUTPATIENT)
Dept: FAMILY MEDICINE | Facility: CLINIC | Age: 56
End: 2021-06-24

## 2021-06-24 VITALS
WEIGHT: 189 LBS | BODY MASS INDEX: 27.99 KG/M2 | HEIGHT: 69 IN | DIASTOLIC BLOOD PRESSURE: 72 MMHG | HEART RATE: 57 BPM | OXYGEN SATURATION: 98 % | SYSTOLIC BLOOD PRESSURE: 112 MMHG | TEMPERATURE: 97.9 F

## 2021-06-24 DIAGNOSIS — E06.3 HASHIMOTO'S THYROIDITIS: ICD-10-CM

## 2021-06-24 DIAGNOSIS — E78.5 HYPERLIPIDEMIA LDL GOAL <130: ICD-10-CM

## 2021-06-24 DIAGNOSIS — F41.1 GAD (GENERALIZED ANXIETY DISORDER): Primary | ICD-10-CM

## 2021-06-24 DIAGNOSIS — Z12.11 SCREENING FOR COLON CANCER: ICD-10-CM

## 2021-06-24 DIAGNOSIS — Z80.0 FAMILY HISTORY OF COLON CANCER: ICD-10-CM

## 2021-06-24 LAB
ALBUMIN SERPL-MCNC: 4.1 G/DL (ref 3.6–5.1)
ALBUMIN/GLOB SERPL: 2 {RATIO} (ref 1–2.5)
ALP SERPL-CCNC: 46 U/L (ref 33–130)
ALT 1742-6: 18 U/L (ref 0–32)
AST 1920-8: 16 U/L (ref 0–35)
BILIRUB SERPL-MCNC: 0.7 MG/DL (ref 0.2–1.2)
BUN SERPL-MCNC: 11 MG/DL (ref 7–25)
BUN/CREATININE RATIO: 10.6 (ref 6–22)
CALCIUM SERPL-MCNC: 9.4 MG/DL (ref 8.6–10.3)
CHLORIDE SERPLBLD-SCNC: 106 MMOL/L (ref 98–110)
CHOLEST SERPL-MCNC: 189 MG/DL (ref 0–199)
CHOLEST/HDLC SERPL: 3 {RATIO} (ref 0–5)
CO2 SERPL-SCNC: 29.5 MMOL/L (ref 20–32)
CREAT SERPL-MCNC: 1.04 MG/DL (ref 0.6–1.3)
GLOBULIN, CALCULATED - QUEST: 2.1 (ref 1.9–3.7)
GLUCOSE SERPL-MCNC: 107 MG/DL (ref 60–99)
HDLC SERPL-MCNC: 69 MG/DL (ref 40–150)
LDLC SERPL CALC-MCNC: 107 MG/DL (ref 0–130)
POTASSIUM SERPL-SCNC: 4.63 MMOL/L (ref 3.5–5.3)
PROT SERPL-MCNC: 6.2 G/DL (ref 6.1–8.1)
SODIUM SERPL-SCNC: 139.4 MMOL/L (ref 135–146)
TRIGL SERPL-MCNC: 67 MG/DL (ref 0–149)

## 2021-06-24 PROCEDURE — 80061 LIPID PANEL: CPT | Performed by: PHYSICIAN ASSISTANT

## 2021-06-24 PROCEDURE — 80053 COMPREHEN METABOLIC PANEL: CPT | Performed by: PHYSICIAN ASSISTANT

## 2021-06-24 PROCEDURE — 36415 COLL VENOUS BLD VENIPUNCTURE: CPT | Performed by: PHYSICIAN ASSISTANT

## 2021-06-24 PROCEDURE — 84443 ASSAY THYROID STIM HORMONE: CPT | Mod: 90 | Performed by: PHYSICIAN ASSISTANT

## 2021-06-24 PROCEDURE — 99213 OFFICE O/P EST LOW 20 MIN: CPT | Performed by: PHYSICIAN ASSISTANT

## 2021-06-24 RX ORDER — LEVOTHYROXINE SODIUM 112 UG/1
112 TABLET ORAL DAILY
Qty: 90 TABLET | Refills: 3 | Status: SHIPPED | OUTPATIENT
Start: 2021-06-24 | End: 2022-06-17

## 2021-06-24 RX ORDER — PRAVASTATIN SODIUM 20 MG
20 TABLET ORAL DAILY
Qty: 90 TABLET | Refills: 3 | Status: SHIPPED | OUTPATIENT
Start: 2021-06-24 | End: 2022-06-17

## 2021-06-24 ASSESSMENT — ANXIETY QUESTIONNAIRES
2. NOT BEING ABLE TO STOP OR CONTROL WORRYING: NOT AT ALL
7. FEELING AFRAID AS IF SOMETHING AWFUL MIGHT HAPPEN: NOT AT ALL
IF YOU CHECKED OFF ANY PROBLEMS ON THIS QUESTIONNAIRE, HOW DIFFICULT HAVE THESE PROBLEMS MADE IT FOR YOU TO DO YOUR WORK, TAKE CARE OF THINGS AT HOME, OR GET ALONG WITH OTHER PEOPLE: NOT DIFFICULT AT ALL
GAD7 TOTAL SCORE: 0
3. WORRYING TOO MUCH ABOUT DIFFERENT THINGS: NOT AT ALL
1. FEELING NERVOUS, ANXIOUS, OR ON EDGE: NOT AT ALL
5. BEING SO RESTLESS THAT IT IS HARD TO SIT STILL: NOT AT ALL
6. BECOMING EASILY ANNOYED OR IRRITABLE: NOT AT ALL

## 2021-06-24 ASSESSMENT — PATIENT HEALTH QUESTIONNAIRE - PHQ9
5. POOR APPETITE OR OVEREATING: NOT AT ALL
SUM OF ALL RESPONSES TO PHQ QUESTIONS 1-9: 3

## 2021-06-24 ASSESSMENT — MIFFLIN-ST. JEOR: SCORE: 1669.74

## 2021-06-24 NOTE — NURSING NOTE
Emerson is here for a fasting med check.        Pre-visit Screening:  Immunizations:  up to date  Colonoscopy:  is due and ordered today  Mammogram: NA  Asthma Action Test/Plan:  NA  PHQ9:  Done today  GAD7:  Done today  Questioned patient about current smoking habits Pt. has never smoked.  Ok to leave detailed message on voice mail for today's visit only Yes, phone # cell

## 2021-06-24 NOTE — PROGRESS NOTES
"CC: Medication Check    History:  Hypothyroid: Takes levothyroxine 112 mcg in the morning. Not always right away. Denies any symptoms of being over or under supplement.     Mixed hyperlipidemia:  Taking pravastatin 20 mg daily. No side effects. Takes in the evening.     Anxiety, irritability:  Takes fluoxetine 20 mg daily. Likes this better than Effexor. Working well to control symptoms, other than still very fatigue.    CHELSEA:  Has been working with pulmonary. [facility]. 1 month ago did repeat sleep study that confirmed CHELSEA. Has had trouble with long term maintenance with CPAP. Appt is July 1 to consider Inspire.       PMH, MEDICATIONS, ALLERGIES, SOCIAL AND FAMILY HISTORY in [facility] and reviewed by me personally.    ROS negative other than the symptoms noted above in the HPI.      Examination   /72 (BP Location: Left arm, Patient Position: Sitting, Cuff Size: Adult Large)   Pulse 57   Temp 97.9  F (36.6  C) (Temporal)   Ht 1.74 m (5' 8.5\")   Wt 85.7 kg (189 lb)   SpO2 98%   BMI 28.32 kg/m       Constitutional: Sitting comfortably, in no acute distress. Vital signs noted  Neck:  no adenopathy, trachea midline and normal to palpation, thyroid normal to palpation  Cardiovascular:  regular rate and rhythm, no murmurs, clicks, or gallops  Respiratory:  normal respiratory rate and rhythm, lungs clear to auscultation  SKIN: No jaundice/pallor/rash.   Psychiatric: mentation appears normal and affect normal/bright      A/P    ICD-10-CM    1. MIKE (generalized anxiety disorder)  F41.1 FLUoxetine (PROZAC) 20 MG capsule   2. Hashimoto's thyroiditis  E06.3 levothyroxine (SYNTHROID/LEVOTHROID) 112 MCG tablet     VENOUS COLLECTION     TSH WITH FREE T4 REFLEX ([facility])   3. Hyperlipidemia LDL goal <130  E78.5 pravastatin (PRAVACHOL) 20 MG tablet     VENOUS COLLECTION     Comprehensive Metobolic Panel (BFP)     Lipid Panel (BFP)   4. Family history of colon cancer  Z80.0 GASTROENTEROLOGY ADULT REF PROCEDURE ONLY   5. Screening " for colon cancer  Z12.11 GASTROENTEROLOGY ADULT REF PROCEDURE ONLY       DISCUSSION:  Hypothyroid:   Will recheck TSH and refill current dose of levothyroxine for 1 year, unless dose change is indicated.     Mixed hyperlipidemia:  Will update fasting labs and send MyChart with results. Refilled for 1 year.     Anxiety, irritability:  MIKE/PHQ updated today and well controlled other than fatigue. Will refill without change for 1 year.     CHELSEA:  Continue pulmonary work-up possible with inspire. Will hopefully help with fatigue.     follow up visit: 1 year    Alberta Badillo PA-C  [facility]

## 2021-06-25 LAB — TSH SERPL-ACNC: 1.86 MIU/L (ref 0.4–4.5)

## 2021-06-25 ASSESSMENT — ANXIETY QUESTIONNAIRES: GAD7 TOTAL SCORE: 0

## 2021-07-02 ENCOUNTER — TRANSFERRED RECORDS (OUTPATIENT)
Dept: FAMILY MEDICINE | Facility: CLINIC | Age: 56
End: 2021-07-02

## 2021-07-23 ENCOUNTER — OFFICE VISIT (OUTPATIENT)
Dept: FAMILY MEDICINE | Facility: CLINIC | Age: 56
End: 2021-07-23

## 2021-07-23 VITALS
HEART RATE: 55 BPM | BODY MASS INDEX: 28.23 KG/M2 | WEIGHT: 188.4 LBS | TEMPERATURE: 97.7 F | OXYGEN SATURATION: 95 % | SYSTOLIC BLOOD PRESSURE: 100 MMHG | DIASTOLIC BLOOD PRESSURE: 60 MMHG

## 2021-07-23 DIAGNOSIS — G47.33 OSA (OBSTRUCTIVE SLEEP APNEA): ICD-10-CM

## 2021-07-23 DIAGNOSIS — F41.1 GAD (GENERALIZED ANXIETY DISORDER): ICD-10-CM

## 2021-07-23 DIAGNOSIS — Z01.818 PREOPERATIVE EXAMINATION: Primary | ICD-10-CM

## 2021-07-23 DIAGNOSIS — E78.49 OTHER HYPERLIPIDEMIA: ICD-10-CM

## 2021-07-23 DIAGNOSIS — E06.3 HASHIMOTO'S THYROIDITIS: ICD-10-CM

## 2021-07-23 LAB
% GRANULOCYTES: 50 %
BUN SERPL-MCNC: 13 MG/DL (ref 7–25)
BUN/CREATININE RATIO: 11.9 (ref 6–22)
CALCIUM SERPL-MCNC: 9.4 MG/DL (ref 8.6–10.3)
CHLORIDE SERPLBLD-SCNC: 108.1 MMOL/L (ref 98–110)
CO2 SERPL-SCNC: 28 MMOL/L (ref 20–32)
CREAT SERPL-MCNC: 1.09 MG/DL (ref 0.6–1.3)
GLUCOSE SERPL-MCNC: 107 MG/DL (ref 60–99)
HCT VFR BLD AUTO: 44.8 % (ref 40–53)
HEMOGLOBIN: 14.5 G/DL (ref 13.3–17.7)
LYMPHOCYTES NFR BLD AUTO: 40.4 %
MCH RBC QN AUTO: 31.6 PG (ref 26–33)
MCHC RBC AUTO-ENTMCNC: 32.4 G/DL (ref 31–36)
MCV RBC AUTO: 97.7 FL (ref 78–100)
MONOCYTES NFR BLD AUTO: 9.6 %
PLATELET COUNT - QUEST: 226 10^9/L (ref 150–375)
POTASSIUM SERPL-SCNC: 4.78 MMOL/L (ref 3.5–5.3)
RBC # BLD AUTO: 4.59 10*12/L (ref 4.4–5.9)
SODIUM SERPL-SCNC: 140.7 MMOL/L (ref 135–146)
WBC # BLD AUTO: 5.7 10*9/L (ref 4–11)

## 2021-07-23 PROCEDURE — 36415 COLL VENOUS BLD VENIPUNCTURE: CPT | Performed by: FAMILY MEDICINE

## 2021-07-23 PROCEDURE — 85025 COMPLETE CBC W/AUTO DIFF WBC: CPT | Performed by: FAMILY MEDICINE

## 2021-07-23 PROCEDURE — 99214 OFFICE O/P EST MOD 30 MIN: CPT | Performed by: FAMILY MEDICINE

## 2021-07-23 PROCEDURE — 80048 BASIC METABOLIC PNL TOTAL CA: CPT | Performed by: FAMILY MEDICINE

## 2021-07-23 NOTE — PROGRESS NOTES
[facility]  [address] [place] [address]  [address]  [facility] [address]  Phone: [phone]  Fax: [phone]  Primary Provider: Alberta Badillo  Pre-op Performing Provider: MARIANA WREN      PREOPERATIVE EVALUATION:  Today's date: 7/23/2021    Claude Dorsey is a 56 year old male who presents for a preoperative evaluation.    Surgical Information:  Surgery/Procedure: endoscopy   Surgery Location: [facility]  Surgeon:    Surgery Date: 7/26/2021  Time of Surgery: 11:45am  Where patient plans to recover: At home with family  Fax number for surgical facility: [phone]    Type of Anesthesia Anticipated: to be determined    Assessment & Plan     The proposed surgical procedure is considered INTERMEDIATE risk.    Problem List Items Addressed This Visit        Respiratory    CHELSEA (obstructive sleep apnea)       Endocrine    Hashimoto's thyroiditis    Other hyperlipidemia       Behavioral    MIKE (generalized anxiety disorder)      Other Visit Diagnoses     Preoperative examination    -  Primary    Relevant Orders    Basic Metabolic Panel (BFP) (Completed)    HEMOGRAM PLATELET DIFF (BFP) (Completed)    VENOUS COLLECTION (Completed)        1. Preoperative examination    - Basic Metabolic Panel (BFP)  - HEMOGRAM PLATELET DIFF (BFP)  - VENOUS COLLECTION    2. CHELSEA (obstructive sleep apnea)      3. Other hyperlipidemia      4. Hashimoto's thyroiditis      5. MIKE (generalized anxiety disorder)    Risks and Recommendations:  The patient has the following additional risks and recommendations for perioperative complications:   - No identified additional risk factors other than previously addressed    Medication Instructions:  Patient is to take all scheduled medications on the day of surgery    RECOMMENDATION:  APPROVAL GIVEN to proceed with proposed procedure, without further diagnostic evaluation.    [id number]    Subjective     HPI related to upcoming procedure: Here for  preop for endoscopy, will knock him out to see how he sleeps and how his throat opens and closes, because of sleep apnea. Wants the sleep inspire, implant.    1. No - Have you ever had a heart attack or stroke?  2. No - Have you ever had surgery on your heart or blood vessels, such as a stent, coronary (heart) bypass, or surgery on an artery in the head, neck, heart, or legs?  3. No - Do you have chest pain when you are physically active?  4. No - Do you have a history of heart failure?  5. No - Do you currently have a cold, bronchitis, or symptoms of other respiratory (head and chest) infections?  6. No - Do you have a cough, shortness of breath, or wheezing?  7. No - Do you or anyone in your family have a history of blood clots?  8. No - Do you or anyone in your family have a serious bleeding problem, such as long-lasting bleeding after surgeries or cuts?  9. No - Have you ever had anemia or been told to take iron pills?  10. No - Have you had any abnormal blood loss such as black, tarry or bloody stools, or abnormal vaginal bleeding?  11. No - Have you ever had a blood transfusion?  12. Yes - Are you willing to have a blood transfusion if it is medically needed before, during, or after your surgery?  13. No - Have you or anyone in your family ever had problems with anesthesia (sedation for surgery)?  14. Yes - Do you have sleep apnea, excessive snoring, or daytime drowsiness? yes Do you have a CPAP machine? Doesn't use cpap  15. No - Do you have any artifical heart valves or other implanted medical devices, such as a pacemaker, defibrillator, or continuous glucose monitor?  16. No - Do you have any artifical joints?  17. No - Are you allergic to latex?  18. No - Is there any chance that you may be pregnant?  Health Care Directive:  Patient does not have a Health Care Directive or Living Will: Discussed advance care planning with patient; however, patient declined at this time.    56}        Review of  Systems  CONSTITUTIONAL: NEGATIVE for fever, chills, change in weight  INTEGUMENTARY/SKIN: NEGATIVE for worrisome rashes, moles or lesions  EYES: NEGATIVE for vision changes or irritation  ENT/MOUTH: NEGATIVE for ear, mouth and throat problems  RESP: NEGATIVE for significant cough or SOB  CV: NEGATIVE for chest pain, palpitations or peripheral edema  GI: NEGATIVE for nausea, abdominal pain, heartburn, or change in bowel habits  : NEGATIVE for frequency, dysuria, or hematuria  MUSCULOSKELETAL: NEGATIVE for significant arthralgias or myalgia  NEURO: NEGATIVE for weakness, dizziness or paresthesias  ENDOCRINE: NEGATIVE for temperature intolerance, skin/hair changes  HEME: NEGATIVE for bleeding problems  PSYCHIATRIC: NEGATIVE for changes in mood or affect    Patient Active Problem List    Diagnosis Date Noted     Chronic fatigue 08/28/2019     Priority: Medium     Obesity 01/09/2018     Priority: Medium     Benign prostatic hyperplasia with urinary frequency 01/09/2018     Priority: Medium     Hashimoto's thyroiditis 10/04/2017     Priority: Medium     Vitamin D deficiency 10/04/2017     Priority: Medium     MIKE (generalized anxiety disorder) 10/04/2017     Priority: Medium     CHELSEA (obstructive sleep apnea) 10/04/2017     Priority: Medium     Other hyperlipidemia 10/04/2017     Priority: Medium     Health Care Home 10/04/2017     Priority: Medium     ACP (advance care planning) 10/04/2017     Priority: Medium     Advance Care Planning 10/4/2017: ACP Review of Chart / Resources Provided:  Reviewed chart for advance care plan.  Claude Willian has no plan or code status on file. Discussed available resources and provided with information.   Added by Catherine Harley              History reviewed. No pertinent past medical history.  History reviewed. No pertinent surgical history.  Current Outpatient Medications   Medication Sig Dispense Refill     FLUoxetine (PROZAC) 20 MG capsule Take 1 capsule (20 mg) by mouth  daily 90 capsule 3     levothyroxine (SYNTHROID/LEVOTHROID) 112 MCG tablet Take 1 tablet (112 mcg) by mouth daily 90 tablet 3     pravastatin (PRAVACHOL) 20 MG tablet Take 1 tablet (20 mg) by mouth daily 90 tablet 3       Allergies   Allergen Reactions     Levitra [Vardenafil]         Social History     Tobacco Use     Smoking status: Never Smoker     Smokeless tobacco: Never Used   Substance Use Topics     Alcohol use: Yes     Comment: 2 drinks per month     Family History   Problem Relation Age of Onset     Melanoma Mother         colon     Thyroid Disease Mother      Glaucoma Mother      Stomach Cancer Maternal Grandmother      Colon Cancer Paternal Grandmother      Pancreatic Cancer Paternal Grandfather      History   Drug Use No         Objective     /60 (BP Location: Right arm, Patient Position: Sitting, Cuff Size: Adult Large)   Pulse 55   Temp 97.7  F (36.5  C)   Wt 85.5 kg (188 lb 6.4 oz)   SpO2 95%   BMI 28.23 kg/m      Physical Exam    GENERAL APPEARANCE: healthy, alert and no distress     EYES: EOMI,  PERRL     HENT: ear canals and TM's normal and nose and mouth without ulcers or lesions     NECK: no adenopathy, no asymmetry, masses, or scars and thyroid normal to palpation     RESP: lungs clear to auscultation - no rales, rhonchi or wheezes     CV: regular rates and rhythm, normal S1 S2, no S3 or S4 and no murmur, click or rub     ABDOMEN:  soft, nontender, no HSM or masses and bowel sounds normal     MS: extremities normal- no gross deformities noted, no evidence of inflammation in joints, FROM in all extremities.     SKIN: no suspicious lesions or rashes     NEURO: Normal strength and tone, sensory exam grossly normal, mentation intact and speech normal     PSYCH: mentation appears normal. and affect normal/bright     LYMPHATICS: No cervical adenopathy    Recent Labs   Lab Test 06/24/21  0000 12/29/20  0000 08/30/19  0000   .4 138.7  --    POTASSIUM 4.63 5.11  --    CR 1.04 1.01  --     A1C  --   --  5.2        Diagnostics:  Recent Results (from the past 168 hour(s))   Basic Metabolic Panel (BFP)    Collection Time: 07/23/21 12:00 AM   Result Value Ref Range    Carbon Dioxide 28.0 20 - 32 mmol/L    Creatinine 1.09 0.60 - 1.30 mg/dL    Glucose 107 (A) 60 - 99 mg/dL    Sodium 140.7 135 - 146 mmol/L    Potassium 4.78 3.5 - 5.3 mmol/L    Chloride 108.1 98 - 110 mmol/L    Urea Nitrogen 13 7 - 25 mg/dL    Calcium 9.4 8.6 - 10.3 mg/dL    BUN/Creatinine Ratio 11.9 6 - 22   HEMOGRAM PLATELET DIFF (BFP)    Collection Time: 07/23/21 12:00 AM   Result Value Ref Range    WBC 5.7 4.0 - 11 10*9/L    RBC Count 4.59 4.4 - 5.9 10*12/L    Hemoglobin 14.5 13.3 - 17.7 g/dL    Hematocrit 44.8 40.0 - 53.0 %    MCV 97.7 78 - 100 fL    MCH 31.6 26 - 33 pg    MCHC 32.4 31 - 36 g/dL    Platelet Count 226 150 - 375 10^9/L    % Granulocytes 50.0 %    % Lymphocytes 40.4 %    % Monocytes 9.6 %          Revised Cardiac Risk Index (RCRI):  The patient has the following serious cardiovascular risks for perioperative complications:   - No serious cardiac risks = 0 points     RCRI Interpretation: 0 points: Class I (very low risk - 0.4% complication rate)           Signed Electronically by: Shae Wernre MD  Copy of this evaluation report is provided to requesting physician.

## 2021-10-05 ENCOUNTER — OFFICE VISIT (OUTPATIENT)
Dept: FAMILY MEDICINE | Facility: CLINIC | Age: 56
End: 2021-10-05

## 2021-10-05 VITALS
HEIGHT: 69 IN | HEART RATE: 57 BPM | SYSTOLIC BLOOD PRESSURE: 118 MMHG | TEMPERATURE: 97.2 F | OXYGEN SATURATION: 97 % | WEIGHT: 194 LBS | BODY MASS INDEX: 28.73 KG/M2 | DIASTOLIC BLOOD PRESSURE: 78 MMHG

## 2021-10-05 DIAGNOSIS — Z01.818 PRE-OP EXAM: Primary | ICD-10-CM

## 2021-10-05 DIAGNOSIS — E78.5 HYPERLIPIDEMIA LDL GOAL <130: ICD-10-CM

## 2021-10-05 DIAGNOSIS — G47.33 OSA (OBSTRUCTIVE SLEEP APNEA): ICD-10-CM

## 2021-10-05 LAB
ERYTHROCYTE [DISTWIDTH] IN BLOOD BY AUTOMATED COUNT: 12.8 %
HCT VFR BLD AUTO: 47.9 % (ref 40–53)
HEMOGLOBIN: 15.9 G/DL (ref 13.3–17.7)
MCH RBC QN AUTO: 32.3 PG (ref 26–33)
MCHC RBC AUTO-ENTMCNC: 33.4 G/DL (ref 31–36)
MCV RBC AUTO: 96.6 FL (ref 78–100)
PLATELET COUNT - QUEST: 221 10^9/L (ref 150–375)
RBC # BLD AUTO: 4.93 10*12/L (ref 4.4–5.9)
WBC # BLD AUTO: 5.3 10*9/L (ref 4–11)

## 2021-10-05 PROCEDURE — 93000 ELECTROCARDIOGRAM COMPLETE: CPT | Performed by: PHYSICIAN ASSISTANT

## 2021-10-05 PROCEDURE — 99214 OFFICE O/P EST MOD 30 MIN: CPT | Mod: 25 | Performed by: PHYSICIAN ASSISTANT

## 2021-10-05 PROCEDURE — 36415 COLL VENOUS BLD VENIPUNCTURE: CPT | Performed by: PHYSICIAN ASSISTANT

## 2021-10-05 PROCEDURE — 85027 COMPLETE CBC AUTOMATED: CPT | Performed by: PHYSICIAN ASSISTANT

## 2021-10-05 ASSESSMENT — MIFFLIN-ST. JEOR: SCORE: 1687.42

## 2021-10-05 NOTE — LETTER
[facility]YSIC[facility]  [address]  [address]  [facility] [address]  Phone: [phone]  Fax: [phone]  Primary Provider: Alberta Whitney  Pre-op Performing Provider: ALBERTA WHITNEY    {Provider  Link to PREOP SmartSet  Use this to apply standard patient instructions to AVS; includes medication directions, common orders, guidelines for anemia, warfarin, additional testing    PREOPERATIVE EVALUATION:  Today's date: 10/5/2021    Claude Dorsey is a 56 year old male who presents for a preoperative evaluation.    Surgical Information:  Surgery/Procedure: Inspire Implant surgery   Surgery Location: [facility]  Surgeon: Dr. Klein  Surgery Date: 10/13/2021  Time of Surgery: 9 AM  Where patient plans to recover: At home with family  Fax number for surgical facility: [phone]    Type of Anesthesia Anticipated: General, but likely will do conscious sedation.    Assessment & Plan     The proposed surgical procedure is considered INTERMEDIATE risk.    Pre-op exam  - VENOUS COLLECTION  - Hemogram Platelet (BFP)  - EKG 12-lead complete w/read - Clinics    CHELSEA (obstructive sleep apnea)  - EKG 12-lead complete w/read - Clinics    Hyperlipidemia LDL goal <130  Normal CMP 6/2021  - EKG 12-lead complete w/read - Clinics    Risks and Recommendations:  The patient has the following additional risks and recommendations for perioperative complications:   - No identified additional risk factors other than previously addressed    Medication Instructions:  Patient is to take all scheduled medications on the day of surgery- levothyroxine is only morning med.    RECOMMENDATION:  APPROVAL GIVEN to proceed with proposed procedure, without further diagnostic evaluation.      20 minutes spent on the date of the encounter doing chart review, review of test results, interpretation of tests, patient visit and documentation     {Provider  Link to MDM Help Grid    Subjective     HPI  related to upcoming procedure:   Pt has been using CPAP for CHELSEA, but tends to take it off, so plan is for Inspire surgery.     1. No - Have you ever had a heart attack or stroke?  2. No - Have you ever had surgery on your heart or blood vessels, such as a stent, coronary (heart) bypass, or surgery on an artery in the head, neck, heart, or legs?  3. No - Do you have chest pain when you are physically active?  4. No - Do you have a history of heart failure?  5. No - Do you currently have a cold, bronchitis, or symptoms of other respiratory (head and chest) infections?  6. No - Do you have a cough, shortness of breath, or wheezing?  7. No - Do you or anyone in your family have a history of blood clots?  8. No - Do you or anyone in your family have a serious bleeding problem, such as long-lasting bleeding after surgeries or cuts?  9. No - Have you ever had anemia or been told to take iron pills?  10. No - Have you had any abnormal blood loss such as black, tarry or bloody stools, or abnormal vaginal bleeding?  11. No - Have you ever had a blood transfusion?  12. Yes - Are you willing to have a blood transfusion if it is medically needed before, during, or after your surgery?  13. No - Have you or anyone in your family ever had problems with anesthesia (sedation for surgery)?  14. Yes - Do you have sleep apnea, excessive snoring, or daytime drowsiness? Yes, being addressed with surgery Do you have a CPAP machine? Yes  15. No - Do you have any artifical heart valves or other implanted medical devices, such as a pacemaker, defibrillator, or continuous glucose monitor?  16. No - Do you have any artifical joints?  17. No - Are you allergic to latex?  18. No - Is there any chance that you may be pregnant?    Health Care Directive:  Patient does not have a Health Care Directive or Living Will: Discussed advance care planning with patient; however, patient declined at this time.    Preoperative Review of :   reviewed - no  record of controlled substances prescribed.    Status of Chronic Conditions:  HYPERLIPIDEMIA - Patient has a long history of significant Hyperlipidemia requiring medication for treatment with recent good control. Patient reports no problems or side effects with the medication.     HYPOTHYROIDISM - Patient has a longstanding history of chronic Hypothyroidism. Patient has been doing well, noting no tremor, insomnia, hair loss or changes in skin texture. Continues to take medications as directed, without adverse reactions or side effects. Last TSH 6/2021.     Lab Results   Component Value Date    TSH 1.86 06/24/2021     Review of Systems  Constitutional, neuro, ENT, endocrine, pulmonary, cardiac, gastrointestinal, genitourinary, musculoskeletal, integument and psychiatric systems are negative, except as otherwise noted.    Patient Active Problem List    Diagnosis Date Noted     Chronic fatigue 08/28/2019     Priority: Medium     Obesity 01/09/2018     Priority: Medium     Benign prostatic hyperplasia with urinary frequency 01/09/2018     Priority: Medium     Hashimoto's thyroiditis 10/04/2017     Priority: Medium     Vitamin D deficiency 10/04/2017     Priority: Medium     MIKE (generalized anxiety disorder) 10/04/2017     Priority: Medium     CHELSEA (obstructive sleep apnea) 10/04/2017     Priority: Medium     Other hyperlipidemia 10/04/2017     Priority: Medium     Health Care Home 10/04/2017     Priority: Medium     ACP (advance care planning) 10/04/2017     Priority: Medium     Advance Care Planning 10/4/2017: ACP Review of Chart / Resources Provided:  Reviewed chart for advance care plan.  Claude Willian has no plan or code status on file. Discussed available resources and provided with information.   Added by Catherine Harley              No past medical history on file.  Past Surgical History:   Procedure Laterality Date     NO HISTORY OF SURGERY       Current Outpatient Medications   Medication Sig Dispense  "Refill     FLUoxetine (PROZAC) 20 MG capsule Take 1 capsule (20 mg) by mouth daily 90 capsule 3     levothyroxine (SYNTHROID/LEVOTHROID) 112 MCG tablet Take 1 tablet (112 mcg) by mouth daily 90 tablet 3     pravastatin (PRAVACHOL) 20 MG tablet Take 1 tablet (20 mg) by mouth daily 90 tablet 3       Allergies   Allergen Reactions     Levitra [Vardenafil]         Social History     Tobacco Use     Smoking status: Never Smoker     Smokeless tobacco: Never Used   Substance Use Topics     Alcohol use: Yes     Comment: 2 drinks per month     Family History   Problem Relation Age of Onset     Melanoma Mother         colon     Thyroid Disease Mother      Glaucoma Mother      Stomach Cancer Maternal Grandmother      Colon Cancer Paternal Grandmother      Pancreatic Cancer Paternal Grandfather          History   Drug Use No         Objective     /78 (BP Location: Left arm, Patient Position: Sitting, Cuff Size: Adult Large)   Pulse 57   Temp 97.2  F (36.2  C) (Temporal)   Ht 1.74 m (5' 8.5\")   Wt 88 kg (194 lb)   SpO2 97%   BMI 29.07 kg/m      Physical Exam    GENERAL APPEARANCE: healthy, alert and no distress     EYES: EOMI,  PERRL     HENT: ear canals and TM's normal and nose and mouth without ulcers or lesions     NECK: no adenopathy, no asymmetry, masses, or scars and thyroid normal to palpation     RESP: lungs clear to auscultation - no rales, rhonchi or wheezes     CV: regular rates and rhythm, normal S1 S2, no S3 or S4 and no murmur, click or rub     ABDOMEN:  soft, nontender, no HSM or masses and bowel sounds normal     MS: extremities normal- no gross deformities noted, no evidence of inflammation in joints, FROM in all extremities.     SKIN: no suspicious lesions or rashes     NEURO: Normal strength and tone, sensory exam grossly normal, mentation intact and speech normal     PSYCH: mentation appears normal. and affect normal/bright     LYMPHATICS: No cervical adenopathy    Diagnostics:  Recent Results " (from the past 48 hour(s))   Hemogram Platelet (BFP)    Collection Time: 10/05/21 12:00 AM   Result Value Ref Range    WBC 5.3 4.0 - 11 10*9/L    RBC Count 4.93 4.4 - 5.9 10*12/L    Hemoglobin 15.9 13.3 - 17.7 g/dL    Hematocrit 47.9 40.0 - 53.0 %    MCV 96.6 78 - 100 fL    MCH 32.3 26 - 33 pg    MCHC 33.4 31 - 36 g/dL    RDW 12.8 %    Platelet Count 221 150 - 375 10^9/L      EKG: sinus bradycardia, normal axis, normal intervals, no acute ST/T changes c/w ischemia, no LVH by voltage criteria, unchanged from previous tracings   *EKG does show HR 44 in supine positive. Upper 50s seated. Denies any symtoms of dizziness, fatigue when laying down and sitting back up. Could be related to CHELSEA. Okay to continue with surgery.     Revised Cardiac Risk Index (RCRI):  The patient has the following serious cardiovascular risks for perioperative complications:   - No serious cardiac risks = 0 points     RCRI Interpretation: 1 point: Class II (low risk - 0.9% complication rate)       Signed Electronically by: Alberta Badillo PA-C  Copy of this evaluation report is provided to requesting physician.    {Provider Resources  Preop [facility] Preop Guidelines  Revised Cardiac Risk Index

## 2021-10-05 NOTE — PROGRESS NOTES
[facility]  [address]  [address]  [facility] [address]  Phone: [phone]  Fax: [phone]  Primary Provider: Alberta Whitney  Pre-op Performing Provider: ALBERTA WHITNEY    {Provider  Link to PREOP SmartSet  Use this to apply standard patient instructions to AVS; includes medication directions, common orders, guidelines for anemia, warfarin, additional testing    PREOPERATIVE EVALUATION:  Today's date: 10/5/2021    Claude Dorsey is a 56 year old male who presents for a preoperative evaluation.    Surgical Information:  Surgery/Procedure: Inspire Implant surgery   Surgery Location: [facility]  Surgeon: Dr. Klein  Surgery Date: 10/13/2021  Time of Surgery: 9 AM  Where patient plans to recover: At home with family  Fax number for surgical facility: [phone]    Type of Anesthesia Anticipated: General, but likely will do conscious sedation.    Assessment & Plan     The proposed surgical procedure is considered INTERMEDIATE risk.    Pre-op exam  - VENOUS COLLECTION  - Hemogram Platelet (BFP)  - EKG 12-lead complete w/read - Clinics    CHELSEA (obstructive sleep apnea)  - EKG 12-lead complete w/read - Clinics    Hyperlipidemia LDL goal <130  Normal CMP 6/2021  - EKG 12-lead complete w/read - Clinics    Risks and Recommendations:  The patient has the following additional risks and recommendations for perioperative complications:   - No identified additional risk factors other than previously addressed    Medication Instructions:  Patient is to take all scheduled medications on the day of surgery- levothyroxine is only morning med.    RECOMMENDATION:  APPROVAL GIVEN to proceed with proposed procedure, without further diagnostic evaluation.      20 minutes spent on the date of the encounter doing chart review, review of test results, interpretation of tests, patient visit and documentation     {Provider  Link to MDM Help Grid    Subjective     HPI  related to upcoming procedure:   Pt has been using CPAP for CHELSEA, but tends to take it off, so plan is for Inspire surgery.     1. No - Have you ever had a heart attack or stroke?  2. No - Have you ever had surgery on your heart or blood vessels, such as a stent, coronary (heart) bypass, or surgery on an artery in the head, neck, heart, or legs?  3. No - Do you have chest pain when you are physically active?  4. No - Do you have a history of heart failure?  5. No - Do you currently have a cold, bronchitis, or symptoms of other respiratory (head and chest) infections?  6. No - Do you have a cough, shortness of breath, or wheezing?  7. No - Do you or anyone in your family have a history of blood clots?  8. No - Do you or anyone in your family have a serious bleeding problem, such as long-lasting bleeding after surgeries or cuts?  9. No - Have you ever had anemia or been told to take iron pills?  10. No - Have you had any abnormal blood loss such as black, tarry or bloody stools, or abnormal vaginal bleeding?  11. No - Have you ever had a blood transfusion?  12. Yes - Are you willing to have a blood transfusion if it is medically needed before, during, or after your surgery?  13. No - Have you or anyone in your family ever had problems with anesthesia (sedation for surgery)?  14. Yes - Do you have sleep apnea, excessive snoring, or daytime drowsiness? Yes, being addressed with surgery Do you have a CPAP machine? Yes  15. No - Do you have any artifical heart valves or other implanted medical devices, such as a pacemaker, defibrillator, or continuous glucose monitor?  16. No - Do you have any artifical joints?  17. No - Are you allergic to latex?  18. No - Is there any chance that you may be pregnant?    Health Care Directive:  Patient does not have a Health Care Directive or Living Will: Discussed advance care planning with patient; however, patient declined at this time.    Preoperative Review of :   reviewed - no  record of controlled substances prescribed.    Status of Chronic Conditions:  HYPERLIPIDEMIA - Patient has a long history of significant Hyperlipidemia requiring medication for treatment with recent good control. Patient reports no problems or side effects with the medication.     HYPOTHYROIDISM - Patient has a longstanding history of chronic Hypothyroidism. Patient has been doing well, noting no tremor, insomnia, hair loss or changes in skin texture. Continues to take medications as directed, without adverse reactions or side effects. Last TSH 6/2021.     Lab Results   Component Value Date    TSH 1.86 06/24/2021     Review of Systems  Constitutional, neuro, ENT, endocrine, pulmonary, cardiac, gastrointestinal, genitourinary, musculoskeletal, integument and psychiatric systems are negative, except as otherwise noted.    Patient Active Problem List    Diagnosis Date Noted     Chronic fatigue 08/28/2019     Priority: Medium     Obesity 01/09/2018     Priority: Medium     Benign prostatic hyperplasia with urinary frequency 01/09/2018     Priority: Medium     Hashimoto's thyroiditis 10/04/2017     Priority: Medium     Vitamin D deficiency 10/04/2017     Priority: Medium     MIKE (generalized anxiety disorder) 10/04/2017     Priority: Medium     CHELSEA (obstructive sleep apnea) 10/04/2017     Priority: Medium     Other hyperlipidemia 10/04/2017     Priority: Medium     Health Care Home 10/04/2017     Priority: Medium     ACP (advance care planning) 10/04/2017     Priority: Medium     Advance Care Planning 10/4/2017: ACP Review of Chart / Resources Provided:  Reviewed chart for advance care plan.  Claude Willian has no plan or code status on file. Discussed available resources and provided with information.   Added by Catherine Harley              No past medical history on file.  Past Surgical History:   Procedure Laterality Date     NO HISTORY OF SURGERY       Current Outpatient Medications   Medication Sig Dispense  "Refill     FLUoxetine (PROZAC) 20 MG capsule Take 1 capsule (20 mg) by mouth daily 90 capsule 3     levothyroxine (SYNTHROID/LEVOTHROID) 112 MCG tablet Take 1 tablet (112 mcg) by mouth daily 90 tablet 3     pravastatin (PRAVACHOL) 20 MG tablet Take 1 tablet (20 mg) by mouth daily 90 tablet 3       Allergies   Allergen Reactions     Levitra [Vardenafil]         Social History     Tobacco Use     Smoking status: Never Smoker     Smokeless tobacco: Never Used   Substance Use Topics     Alcohol use: Yes     Comment: 2 drinks per month     Family History   Problem Relation Age of Onset     Melanoma Mother         colon     Thyroid Disease Mother      Glaucoma Mother      Stomach Cancer Maternal Grandmother      Colon Cancer Paternal Grandmother      Pancreatic Cancer Paternal Grandfather          History   Drug Use No         Objective     /78 (BP Location: Left arm, Patient Position: Sitting, Cuff Size: Adult Large)   Pulse 57   Temp 97.2  F (36.2  C) (Temporal)   Ht 1.74 m (5' 8.5\")   Wt 88 kg (194 lb)   SpO2 97%   BMI 29.07 kg/m      Physical Exam    GENERAL APPEARANCE: healthy, alert and no distress     EYES: EOMI,  PERRL     HENT: ear canals and TM's normal and nose and mouth without ulcers or lesions     NECK: no adenopathy, no asymmetry, masses, or scars and thyroid normal to palpation     RESP: lungs clear to auscultation - no rales, rhonchi or wheezes     CV: regular rates and rhythm, normal S1 S2, no S3 or S4 and no murmur, click or rub     ABDOMEN:  soft, nontender, no HSM or masses and bowel sounds normal     MS: extremities normal- no gross deformities noted, no evidence of inflammation in joints, FROM in all extremities.     SKIN: no suspicious lesions or rashes     NEURO: Normal strength and tone, sensory exam grossly normal, mentation intact and speech normal     PSYCH: mentation appears normal. and affect normal/bright     LYMPHATICS: No cervical adenopathy    Diagnostics:  Recent Results " (from the past 48 hour(s))   Hemogram Platelet (BFP)    Collection Time: 10/05/21 12:00 AM   Result Value Ref Range    WBC 5.3 4.0 - 11 10*9/L    RBC Count 4.93 4.4 - 5.9 10*12/L    Hemoglobin 15.9 13.3 - 17.7 g/dL    Hematocrit 47.9 40.0 - 53.0 %    MCV 96.6 78 - 100 fL    MCH 32.3 26 - 33 pg    MCHC 33.4 31 - 36 g/dL    RDW 12.8 %    Platelet Count 221 150 - 375 10^9/L      EKG: sinus bradycardia, normal axis, normal intervals, no acute ST/T changes c/w ischemia, no LVH by voltage criteria, unchanged from previous tracings   *EKG does show HR 44 in supine positive. Upper 50s seated. Denies any symtoms of dizziness, fatigue when laying down and sitting back up. Could be related to CHELSEA. Okay to continue with surgery.     Revised Cardiac Risk Index (RCRI):  The patient has the following serious cardiovascular risks for perioperative complications:   - No serious cardiac risks = 0 points     RCRI Interpretation: 1 point: Class II (low risk - 0.9% complication rate)       Signed Electronically by: Alberta Badillo PA-C  Copy of this evaluation report is provided to requesting physician.    {Provider Resources  Preop [facility] Preop Guidelines  Revised Cardiac Risk Index

## 2021-11-29 ENCOUNTER — TRANSFERRED RECORDS (OUTPATIENT)
Dept: FAMILY MEDICINE | Facility: CLINIC | Age: 56
End: 2021-11-29

## 2021-12-04 ENCOUNTER — HEALTH MAINTENANCE LETTER (OUTPATIENT)
Age: 56
End: 2021-12-04

## 2021-12-29 ENCOUNTER — TRANSFERRED RECORDS (OUTPATIENT)
Dept: FAMILY MEDICINE | Facility: CLINIC | Age: 56
End: 2021-12-29

## 2022-05-21 ENCOUNTER — HEALTH MAINTENANCE LETTER (OUTPATIENT)
Age: 57
End: 2022-05-21

## 2022-06-17 ENCOUNTER — OFFICE VISIT (OUTPATIENT)
Dept: FAMILY MEDICINE | Facility: CLINIC | Age: 57
End: 2022-06-17

## 2022-06-17 VITALS
TEMPERATURE: 97.3 F | BODY MASS INDEX: 26.96 KG/M2 | OXYGEN SATURATION: 96 % | HEIGHT: 69 IN | SYSTOLIC BLOOD PRESSURE: 110 MMHG | HEART RATE: 59 BPM | DIASTOLIC BLOOD PRESSURE: 62 MMHG | WEIGHT: 182 LBS

## 2022-06-17 DIAGNOSIS — F41.1 GAD (GENERALIZED ANXIETY DISORDER): Primary | ICD-10-CM

## 2022-06-17 DIAGNOSIS — E06.3 HASHIMOTO'S THYROIDITIS: ICD-10-CM

## 2022-06-17 DIAGNOSIS — Z13.220 SCREENING FOR LIPID DISORDERS: ICD-10-CM

## 2022-06-17 DIAGNOSIS — R73.01 ELEVATED FASTING GLUCOSE: ICD-10-CM

## 2022-06-17 DIAGNOSIS — Z12.5 SCREENING FOR PROSTATE CANCER: ICD-10-CM

## 2022-06-17 DIAGNOSIS — E78.5 HYPERLIPIDEMIA LDL GOAL <130: ICD-10-CM

## 2022-06-17 DIAGNOSIS — Z13.228 SCREENING FOR METABOLIC DISORDER: ICD-10-CM

## 2022-06-17 LAB
ALBUMIN SERPL-MCNC: 4.2 G/DL (ref 3.6–5.1)
ALBUMIN/GLOB SERPL: 2.1 {RATIO} (ref 1–2.5)
ALP SERPL-CCNC: 51 U/L (ref 33–130)
ALT 1742-6: 22 U/L (ref 0–32)
AST 1920-8: 20 U/L (ref 0–35)
BILIRUB SERPL-MCNC: 0.7 MG/DL (ref 0.2–1.2)
BUN SERPL-MCNC: 14 MG/DL (ref 7–25)
BUN/CREATININE RATIO: 13.2 (ref 6–22)
CALCIUM SERPL-MCNC: 9.2 MG/DL (ref 8.6–10.3)
CHLORIDE SERPLBLD-SCNC: 106.1 MMOL/L (ref 98–110)
CHOLEST SERPL-MCNC: 180 MG/DL (ref 0–199)
CHOLEST/HDLC SERPL: 3 {RATIO} (ref 0–5)
CO2 SERPL-SCNC: 29 MMOL/L (ref 20–32)
CREAT SERPL-MCNC: 1.06 MG/DL (ref 0.6–1.3)
GLOBULIN, CALCULATED - QUEST: 2 (ref 1.9–3.7)
GLUCOSE SERPL-MCNC: 94 MG/DL (ref 60–99)
HBA1C MFR BLD: 5.3 % (ref 4–7)
HDLC SERPL-MCNC: 65 MG/DL (ref 40–150)
LDLC SERPL CALC-MCNC: 105 MG/DL (ref 0–130)
POTASSIUM SERPL-SCNC: 4.75 MMOL/L (ref 3.5–5.3)
PROT SERPL-MCNC: 6.2 G/DL (ref 6.1–8.1)
SODIUM SERPL-SCNC: 140 MMOL/L (ref 135–146)
TRIGL SERPL-MCNC: 51 MG/DL (ref 0–149)

## 2022-06-17 PROCEDURE — 99213 OFFICE O/P EST LOW 20 MIN: CPT | Performed by: PHYSICIAN ASSISTANT

## 2022-06-17 PROCEDURE — 83036 HEMOGLOBIN GLYCOSYLATED A1C: CPT | Performed by: PHYSICIAN ASSISTANT

## 2022-06-17 PROCEDURE — 84443 ASSAY THYROID STIM HORMONE: CPT | Mod: 90 | Performed by: PHYSICIAN ASSISTANT

## 2022-06-17 PROCEDURE — 80061 LIPID PANEL: CPT | Performed by: PHYSICIAN ASSISTANT

## 2022-06-17 PROCEDURE — 80053 COMPREHEN METABOLIC PANEL: CPT | Performed by: PHYSICIAN ASSISTANT

## 2022-06-17 PROCEDURE — 84153 ASSAY OF PSA TOTAL: CPT | Mod: 90 | Performed by: PHYSICIAN ASSISTANT

## 2022-06-17 PROCEDURE — 36415 COLL VENOUS BLD VENIPUNCTURE: CPT | Performed by: PHYSICIAN ASSISTANT

## 2022-06-17 PROCEDURE — 84439 ASSAY OF FREE THYROXINE: CPT | Mod: 90 | Performed by: PHYSICIAN ASSISTANT

## 2022-06-17 RX ORDER — LEVOTHYROXINE SODIUM 112 UG/1
112 TABLET ORAL DAILY
Qty: 90 TABLET | Refills: 3 | Status: SHIPPED | OUTPATIENT
Start: 2022-06-17 | End: 2023-06-16

## 2022-06-17 RX ORDER — PRAVASTATIN SODIUM 20 MG
20 TABLET ORAL DAILY
Qty: 90 TABLET | Refills: 3 | Status: SHIPPED | OUTPATIENT
Start: 2022-06-17 | End: 2023-06-16

## 2022-06-17 RX ORDER — MULTIPLE VITAMINS W/ MINERALS TAB 9MG-400MCG
1 TAB ORAL DAILY
COMMUNITY

## 2022-06-17 ASSESSMENT — ANXIETY QUESTIONNAIRES
GAD7 TOTAL SCORE: 3
2. NOT BEING ABLE TO STOP OR CONTROL WORRYING: SEVERAL DAYS
1. FEELING NERVOUS, ANXIOUS, OR ON EDGE: SEVERAL DAYS
IF YOU CHECKED OFF ANY PROBLEMS ON THIS QUESTIONNAIRE, HOW DIFFICULT HAVE THESE PROBLEMS MADE IT FOR YOU TO DO YOUR WORK, TAKE CARE OF THINGS AT HOME, OR GET ALONG WITH OTHER PEOPLE: NOT DIFFICULT AT ALL
5. BEING SO RESTLESS THAT IT IS HARD TO SIT STILL: NOT AT ALL
3. WORRYING TOO MUCH ABOUT DIFFERENT THINGS: SEVERAL DAYS
GAD7 TOTAL SCORE: 3
6. BECOMING EASILY ANNOYED OR IRRITABLE: NOT AT ALL
7. FEELING AFRAID AS IF SOMETHING AWFUL MIGHT HAPPEN: NOT AT ALL

## 2022-06-17 ASSESSMENT — PATIENT HEALTH QUESTIONNAIRE - PHQ9
SUM OF ALL RESPONSES TO PHQ QUESTIONS 1-9: 6
5. POOR APPETITE OR OVEREATING: NOT AT ALL

## 2022-06-17 NOTE — NURSING NOTE
Chief Complaint   Patient presents with     Recheck Medication     fasting         Pre-visit Screening:  Immunizations:  up to date  Colonoscopy:  is up to date  Mammogram: NA  Asthma Action Test/Plan:  NA  PHQ9:  Done today  GAD7:  Done today  Questioned patient about current smoking habits Pt. has never smoked.  Ok to leave detailed message on voice mail for today's visit only Yes, phone # [phone]

## 2022-06-17 NOTE — PROGRESS NOTES
"CC: Medication Check    History:  Hypothyroid:  Stable on current dose of levothyroxine 112 mcg daily. Denies any symptoms of being over or under supplemented. Last thyroid blood tests and were normal without change. Takes medication first thing in the morning with glass of water 30-60 minutes before eating.     Hyperlipidemia:  Takes pravastatin 20 mg daily. Has been taking this consistently. No side effects.     Anxiety, irritability:  Stable on fluoxetine 20 mg. Continues to do well on this.     PMH, MEDICATIONS, ALLERGIES, SOCIAL AND FAMILY HISTORY in [facility] and reviewed by me personally.    ROS negative other than the symptoms noted above in the HPI.      Examination   /62 (BP Location: Right arm, Patient Position: Sitting, Cuff Size: Adult Large)   Pulse 59   Temp 97.3  F (36.3  C) (Temporal)   Ht 1.74 m (5' 8.5\")   Wt 82.6 kg (182 lb)   SpO2 96%   BMI 27.27 kg/m      Constitutional: Sitting comfortably, in no acute distress. Vital signs noted  Neck:  no adenopathy, trachea midline and normal to palpation, thyroid normal to palpation  Cardiovascular:  regular rate and rhythm, no murmurs, clicks, or gallops  Respiratory:  normal respiratory rate and rhythm, lungs clear to auscultation  SKIN: No jaundice/pallor/rash.   Psychiatric: mentation appears normal and affect normal/bright        A/P    ICD-10-CM    1. Screening for metabolic disorder  Z13.228 Comprehensive Metobolic Panel (BFP)     VENOUS COLLECTION   2. MIKE (generalized anxiety disorder)  F41.1    3. Hashimoto's thyroiditis  E06.3 Lipid Panel (BFP)     VENOUS COLLECTION     TSH WITH FREE T4 REFLEX ([facility])   4. Hyperlipidemia LDL goal <130  E78.5 Comprehensive Metobolic Panel (BFP)     Lipid Panel (BFP)     VENOUS COLLECTION   5. Screening for lipid disorders  Z13.220 Lipid Panel (BFP)     VENOUS COLLECTION   6. Elevated fasting glucose  R73.01 VENOUS COLLECTION     HEMOGLOBIN A1C (BFP)   7. Screening for prostate cancer  Z12.5 VENOUS " COLLECTION     PSA Total ([facility])       DISCUSSION:  Hypothyroid:  Will recheck TSH and refill current dose of levothyroxine for 1 year, unless dose change is indicated.     Hyperlipidemia:  Will update fasting labs and send MyChart. Will refill for 1 year.    Anxiety, irritability:  PHQ/MKIE updated today. Doing well. Will refill without change for 1 year    CHELSEA:  Had inspire procedure 10/2021. Unfortunately, no notable improvement in sleep quality, fatigue. Follow-up has been difficult with scheduling, but scheduled for next month, where hopefully adjustments, next steps can be considered.     Will also check PSA, A1c. No concerns, urinary symptoms.     follow up visit: 1 year    Alberta Badillo PA-C  [facility]

## 2022-06-18 LAB
ABBOTT PSA - QUEST: 0.83 NG/ML
T4, FREE, NON-DIALYSIS - QUEST: 0.9 NG/DL (ref 0.8–1.8)
TSH SERPL-ACNC: 5.19 MIU/L (ref 0.4–4.5)

## 2022-06-21 ENCOUNTER — MYC MEDICAL ADVICE (OUTPATIENT)
Dept: FAMILY MEDICINE | Facility: CLINIC | Age: 57
End: 2022-06-21

## 2022-09-17 ENCOUNTER — HEALTH MAINTENANCE LETTER (OUTPATIENT)
Age: 57
End: 2022-09-17

## 2023-03-26 ENCOUNTER — TRANSFERRED RECORDS (OUTPATIENT)
Dept: FAMILY MEDICINE | Facility: CLINIC | Age: 58
End: 2023-03-26

## 2023-04-24 ENCOUNTER — TRANSFERRED RECORDS (OUTPATIENT)
Dept: FAMILY MEDICINE | Facility: CLINIC | Age: 58
End: 2023-04-24

## 2023-06-04 ENCOUNTER — HEALTH MAINTENANCE LETTER (OUTPATIENT)
Age: 58
End: 2023-06-04

## 2023-06-13 ENCOUNTER — MYC MEDICAL ADVICE (OUTPATIENT)
Dept: FAMILY MEDICINE | Facility: CLINIC | Age: 58
End: 2023-06-13

## 2023-06-13 DIAGNOSIS — E06.3 HASHIMOTO'S THYROIDITIS: ICD-10-CM

## 2023-06-13 DIAGNOSIS — F41.1 GAD (GENERALIZED ANXIETY DISORDER): ICD-10-CM

## 2023-06-13 DIAGNOSIS — E78.5 HYPERLIPIDEMIA LDL GOAL <130: ICD-10-CM

## 2023-06-16 RX ORDER — PRAVASTATIN SODIUM 20 MG
20 TABLET ORAL DAILY
Qty: 14 TABLET | Refills: 0 | Status: SHIPPED | OUTPATIENT
Start: 2023-06-16 | End: 2023-06-27

## 2023-06-16 RX ORDER — LEVOTHYROXINE SODIUM 112 UG/1
112 TABLET ORAL DAILY
Qty: 14 TABLET | Refills: 0 | Status: SHIPPED | OUTPATIENT
Start: 2023-06-16 | End: 2023-06-30

## 2023-06-16 NOTE — TELEPHONE ENCOUNTER
Claude Dorsey is requesting a refill of:    Pending Prescriptions:                       Disp   Refills    FLUoxetine (PROZAC) 20 MG capsule         14 cap*0            Sig: Take 1 capsule (20 mg) by mouth daily    levothyroxine (SYNTHROID/LEVOTHROID) 112 *14 tab*0            Sig: Take 1 tablet (112 mcg) by mouth daily    pravastatin (PRAVACHOL) 20 MG tablet      14 tab*0            Sig: Take 1 tablet (20 mg) by mouth daily    Pt has OV on 6/28/23

## 2023-06-28 ENCOUNTER — OFFICE VISIT (OUTPATIENT)
Dept: FAMILY MEDICINE | Facility: CLINIC | Age: 58
End: 2023-06-28

## 2023-06-28 VITALS
HEIGHT: 69 IN | TEMPERATURE: 97.8 F | WEIGHT: 191 LBS | OXYGEN SATURATION: 96 % | HEART RATE: 60 BPM | SYSTOLIC BLOOD PRESSURE: 116 MMHG | DIASTOLIC BLOOD PRESSURE: 84 MMHG | BODY MASS INDEX: 28.29 KG/M2

## 2023-06-28 DIAGNOSIS — E06.3 HASHIMOTO'S THYROIDITIS: Primary | ICD-10-CM

## 2023-06-28 DIAGNOSIS — R68.82 LOW LIBIDO: ICD-10-CM

## 2023-06-28 DIAGNOSIS — Z13.1 SCREENING FOR DIABETES MELLITUS: ICD-10-CM

## 2023-06-28 DIAGNOSIS — E78.5 HYPERLIPIDEMIA LDL GOAL <130: ICD-10-CM

## 2023-06-28 DIAGNOSIS — R53.82 CHRONIC FATIGUE: ICD-10-CM

## 2023-06-28 DIAGNOSIS — Z12.5 SCREENING FOR PROSTATE CANCER: ICD-10-CM

## 2023-06-28 DIAGNOSIS — F41.1 GAD (GENERALIZED ANXIETY DISORDER): ICD-10-CM

## 2023-06-28 LAB
ALBUMIN SERPL-MCNC: 3.9 G/DL (ref 3.6–5.1)
ALBUMIN/GLOB SERPL: 1.6 {RATIO} (ref 1–2.5)
ALP SERPL-CCNC: 56 U/L (ref 33–130)
ALT 1742-6: 26 U/L (ref 0–32)
AST 1920-8: 26 U/L (ref 0–35)
BILIRUB SERPL-MCNC: 0.9 MG/DL (ref 0.2–1.2)
BUN SERPL-MCNC: 13 MG/DL (ref 7–25)
BUN/CREATININE RATIO: 12 (ref 6–32)
CALCIUM SERPL-MCNC: 9.2 MG/DL (ref 8.6–10.3)
CHLORIDE SERPLBLD-SCNC: 108.6 MMOL/L (ref 98–110)
CHOLEST SERPL-MCNC: 193 MG/DL (ref 0–199)
CHOLEST/HDLC SERPL: 3 {RATIO} (ref 0–5)
CO2 SERPL-SCNC: 26.3 MMOL/L (ref 20–32)
CREAT SERPL-MCNC: 1.08 MG/DL (ref 0.6–1.3)
GLOBULIN, CALCULATED - QUEST: 2.5 (ref 1.9–3.7)
GLUCOSE SERPL-MCNC: 100 MG/DL (ref 60–99)
HBA1C MFR BLD: 5.4 % (ref 4–7)
HDLC SERPL-MCNC: 63 MG/DL (ref 40–150)
LDLC SERPL CALC-MCNC: 115 MG/DL (ref 0–130)
POTASSIUM SERPL-SCNC: 4.3 MMOL/L (ref 3.5–5.3)
PROT SERPL-MCNC: 6.4 G/DL (ref 6.1–8.1)
SODIUM SERPL-SCNC: 140.2 MMOL/L (ref 135–146)
TRIGL SERPL-MCNC: 73 MG/DL (ref 0–149)

## 2023-06-28 PROCEDURE — 80053 COMPREHEN METABOLIC PANEL: CPT | Performed by: PHYSICIAN ASSISTANT

## 2023-06-28 PROCEDURE — 36415 COLL VENOUS BLD VENIPUNCTURE: CPT | Performed by: PHYSICIAN ASSISTANT

## 2023-06-28 PROCEDURE — 83036 HEMOGLOBIN GLYCOSYLATED A1C: CPT | Performed by: PHYSICIAN ASSISTANT

## 2023-06-28 PROCEDURE — 99213 OFFICE O/P EST LOW 20 MIN: CPT | Performed by: PHYSICIAN ASSISTANT

## 2023-06-28 PROCEDURE — 80061 LIPID PANEL: CPT | Performed by: PHYSICIAN ASSISTANT

## 2023-06-28 RX ORDER — LEVOTHYROXINE SODIUM 112 UG/1
112 TABLET ORAL DAILY
Qty: 90 TABLET | Refills: 3 | Status: CANCELLED | OUTPATIENT
Start: 2023-06-28

## 2023-06-28 RX ORDER — PRAVASTATIN SODIUM 20 MG
20 TABLET ORAL DAILY
Qty: 90 TABLET | Refills: 3 | Status: SHIPPED | OUTPATIENT
Start: 2023-06-28 | End: 2024-06-19

## 2023-06-28 RX ORDER — LEVOTHYROXINE SODIUM 112 MCG
112 TABLET ORAL DAILY
Qty: 90 TABLET | Refills: 0 | Status: SHIPPED | OUTPATIENT
Start: 2023-06-28 | End: 2024-06-19

## 2023-06-28 ASSESSMENT — PATIENT HEALTH QUESTIONNAIRE - PHQ9
5. POOR APPETITE OR OVEREATING: NOT AT ALL
SUM OF ALL RESPONSES TO PHQ QUESTIONS 1-9: 7

## 2023-06-28 ASSESSMENT — ANXIETY QUESTIONNAIRES
6. BECOMING EASILY ANNOYED OR IRRITABLE: SEVERAL DAYS
5. BEING SO RESTLESS THAT IT IS HARD TO SIT STILL: NOT AT ALL
2. NOT BEING ABLE TO STOP OR CONTROL WORRYING: NOT AT ALL
1. FEELING NERVOUS, ANXIOUS, OR ON EDGE: NOT AT ALL
GAD7 TOTAL SCORE: 1
GAD7 TOTAL SCORE: 1
3. WORRYING TOO MUCH ABOUT DIFFERENT THINGS: NOT AT ALL
IF YOU CHECKED OFF ANY PROBLEMS ON THIS QUESTIONNAIRE, HOW DIFFICULT HAVE THESE PROBLEMS MADE IT FOR YOU TO DO YOUR WORK, TAKE CARE OF THINGS AT HOME, OR GET ALONG WITH OTHER PEOPLE: NOT DIFFICULT AT ALL
7. FEELING AFRAID AS IF SOMETHING AWFUL MIGHT HAPPEN: NOT AT ALL

## 2023-06-28 NOTE — PROGRESS NOTES
"CC: Medication Check    History:  Hypothyroid, chronic fatigue:  Stable on current dose of levothyroxine 112 mcg daily. Denies any symptoms of being over or under supplemented. Last thyroid blood tests 6/2022 were normal without change. Takes medication first thing in the morning with glass of water 30-60 minutes before eating. Given his chronic fatigue, he is interested in trying brand name Synthroid.      Hyperlipidemia:  Stable on pravastatin 20 mg daily. Taking this consistently. Denies any side effects.      Anxiety, irritability:  Takes fluoxetine 20 mg. Continues to do well on this.     CHELSEA, chronic fatigue:  Did not tolerate CPAP. Has Inspire placed 10/2021, but this has not been successful in improving his symptoms, sleep. He does plan to follow-up with them again soon. Pt is wondering if he can have his testosterone level checked. Also endorses some low libido.     PMH, MEDICATIONS, ALLERGIES, SOCIAL AND FAMILY HISTORY in EPIC and reviewed by me personally.    ROS negative other than the symptoms noted above in the HPI.      Examination   /84 (BP Location: Left arm, Patient Position: Sitting, Cuff Size: Adult Large)   Pulse 60   Temp 97.8  F (36.6  C) (Temporal)   Ht 1.74 m (5' 8.5\")   Wt 86.6 kg (191 lb)   SpO2 96%   BMI 28.62 kg/m       Constitutional: Sitting comfortably, in no acute distress. Vital signs noted  Neck:  no adenopathy, trachea midline and normal to palpation, thyroid normal to palpation  Cardiovascular:  regular rate and rhythm, no murmurs, clicks, or gallops  Respiratory:  normal respiratory rate and rhythm, lungs clear to auscultation  SKIN: No jaundice/pallor/rash.   Psychiatric: mentation appears normal and affect normal/bright        A/P    ICD-10-CM    1. Screening for prostate cancer  Z12.5 VENOUS COLLECTION     PSA Total ([facility])      2. Hashimoto's thyroiditis  E06.3 VENOUS COLLECTION     TSH WITH FREE T4 REFLEX ([facility])     VENOUS COLLECTION     TSH WITH FREE T4 REFLEX " ([facility])      3. Hyperlipidemia LDL goal <130  E78.5 pravastatin (PRAVACHOL) 20 MG tablet     VENOUS COLLECTION     Lipid Panel ([facility])     Comprehensive Metobolic Panel ([facility])      4. MIKE (generalized anxiety disorder)  F41.1 FLUoxetine (PROZAC) 20 MG capsule      5. Screening for diabetes mellitus  Z13.1 VENOUS COLLECTION     HEMOGLOBIN A1C ([facility])      6. Chronic fatigue  R53.82 Testosterone Free and Total ([facility])     SYNTHROID 112 MCG tablet      7. Low libido  R68.82 Testosterone Free and Total ([facility])          DISCUSSION:  Hypothyroid, chronic fatigue:  Will recheck TSH and refill current dose of levothyroxine for 1 year, unless dose change is indicated. Agreed to do trial of Synthroid to see if this helps improve fatigue. Placed standing order for pt to return after at least 6 weeks on new dose to recheck TSH.      Hyperlipidemia:  Will check fasting labs today and send MyChart with results when available. Will refill medication without change for 1 year.      Anxiety, irritability:  PHQ-9 and MIKE-7 updated today and well controlled. Agreed to refill current medication without change for 1 year. Contact us sooner with concerns, worsening.    CHELSEA, chronic fatigue:  Continue working with sleep specialist to optimize CHELSEA. Will check testosterone today and contact pt with results. If low, would coordinate referral to endocrinology.     follow up visit: 1 year, lab only 2-3 months    Alberta Badillo PA-C  [facility]

## 2023-06-28 NOTE — NURSING NOTE
Chief Complaint   Patient presents with     Recheck Medication     Pt is fasting     Pre-visit Screening:  Immunizations:  not up to date - told to go to pharmacy for shingles  Colonoscopy:  is up to date  Mammogram: na  Asthma Action Test/Plan:  na  PHQ9:  Given today  GAD7:  Given today  Questioned patient about current smoking habits Pt. has never smoked.  Ok to leave detailed message on voice mail for today's visit only Yes, phone # [phone]

## 2023-06-29 LAB
ABBOTT PSA - QUEST: 0.86 NG/ML
TSH SERPL-ACNC: 1.92 MIU/L (ref 0.4–4.5)

## 2023-07-02 LAB
TESTOSTERONE FREE LC/MS/MS - QUEST: 65.1 PG/ML (ref 35–155)
TESTOSTERONE, TOTAL, LC/MS/MS-QUEST: 483 NG/DL (ref 250–1100)

## 2023-08-01 ENCOUNTER — MYC MEDICAL ADVICE (OUTPATIENT)
Dept: FAMILY MEDICINE | Facility: CLINIC | Age: 58
End: 2023-08-01

## 2023-08-01 DIAGNOSIS — F41.1 GAD (GENERALIZED ANXIETY DISORDER): Primary | ICD-10-CM

## 2023-08-02 NOTE — TELEPHONE ENCOUNTER
Please review pt's mychart message.    Claude Dorsey is requesting a refill of:    Pending Prescriptions:                       Disp   Refills    FLUoxetine (PROZAC) 20 MG capsule         90 cap*0            Sig: Take 1 capsule (20 mg) by mouth daily

## 2023-09-25 ENCOUNTER — MYC MEDICAL ADVICE (OUTPATIENT)
Dept: FAMILY MEDICINE | Facility: CLINIC | Age: 58
End: 2023-09-25

## 2024-03-16 ENCOUNTER — MYC MEDICAL ADVICE (OUTPATIENT)
Dept: FAMILY MEDICINE | Facility: CLINIC | Age: 59
End: 2024-03-16

## 2024-03-16 DIAGNOSIS — E06.3 HASHIMOTO'S THYROIDITIS: Primary | ICD-10-CM

## 2024-03-18 NOTE — TELEPHONE ENCOUNTER
Claude Dorsey is requesting a refill of:    Pending Prescriptions:                       Disp   Refills    levothyroxine (SYNTHROID/LEVOTHROID) 112 *30 tab*0            Sig: Take 1 tablet (112 mcg) by mouth daily    Do you want to Rx 1 month and have him come in for an OV for refills? Was given 90 days in June with no refills. States that he forgets to take    Please send [facility]

## 2024-03-19 RX ORDER — LEVOTHYROXINE SODIUM 112 UG/1
112 TABLET ORAL DAILY
Qty: 90 TABLET | Refills: 0 | Status: SHIPPED | OUTPATIENT
Start: 2024-03-19 | End: 2024-06-19

## 2024-03-20 NOTE — TELEPHONE ENCOUNTER
Refilled #90 as he has to take daily for 8 weeks for lab testing to be accurate. Due for medication check in June anyways so can do BW at that time.

## 2024-03-29 ENCOUNTER — TRANSFERRED RECORDS (OUTPATIENT)
Dept: FAMILY MEDICINE | Facility: CLINIC | Age: 59
End: 2024-03-29

## 2024-05-08 DIAGNOSIS — F41.1 GAD (GENERALIZED ANXIETY DISORDER): ICD-10-CM

## 2024-05-08 NOTE — TELEPHONE ENCOUNTER
Claude Dorsey is requesting a refill of:    Pending Prescriptions:                       Disp   Refills    FLUoxetine (PROZAC) 20 MG capsule [Pharma*30 cap*0            Sig: TAKE 1 CAPSULE BY MOUTH EVERY DAY    Needs OV in June for refills

## 2024-06-13 NOTE — PROGRESS NOTES
Preventive Care Visit  [facility], PDEQUAN Werner MD, Family Medicine  Jun 19, 2024      SUBJECTIVE:   Emerson is a 58 year old, presenting for the following:  Physical (Fasting today) and Recheck Medication (Refill medications)      HPI    Here for a physical. Due for refills on medications.   Fluoxetine, thyroid and cholesterol. He is doing well on medications, no problems or side effects.                Social History     Tobacco Use    Smoking status: Never     Passive exposure: Never    Smokeless tobacco: Never   Substance Use Topics    Alcohol use: Yes     Alcohol/week: 1.0 standard drink of alcohol     Types: 1 Standard drinks or equivalent per week     Comment: occasionally              No data to display            No concerns    Last PSA:   [facility]ott PSA   Date Value Ref Range Status   06/28/2023 0.86 < OR = 4.00 ng/mL Final     Comment:     The total PSA value from this assay system is   standardized against the WHO standard. The test   result will be approximately 20% lower when compared   to the equimolar-standardized total PSA (Steffanie   Laurel). Comparison of serial PSA results should be   interpreted with this fact in mind.     This test was performed using the Siemens   chemiluminescent method. Values obtained from   different assay methods cannot be used  interchangeably. PSA levels, regardless of  value, should not be interpreted as absolute  evidence of the presence or absence of disease.         Reviewed orders with patient. Reviewed health maintenance and updated orders accordingly - Yes  BP Readings from Last 3 Encounters:   06/19/24 116/78   06/28/23 116/84   06/17/22 110/62    Wt Readings from Last 3 Encounters:   06/19/24 80.3 kg (177 lb)   06/28/23 86.6 kg (191 lb)   06/17/22 82.6 kg (182 lb)                  Patient Active Problem List   Diagnosis    Hashimoto's thyroiditis    Vitamin D deficiency    MIKE (generalized anxiety disorder)    CHELSEA (obstructive sleep apnea)  "   Other hyperlipidemia    ACP (advance care planning)    Obesity    Benign prostatic hyperplasia with urinary frequency    Chronic fatigue     Past Surgical History:   Procedure Laterality Date    OTHER SURGICAL HISTORY  10/13/2021    Inspire       Social History     Tobacco Use    Smoking status: Never     Passive exposure: Never    Smokeless tobacco: Never   Substance Use Topics    Alcohol use: Yes     Alcohol/week: 1.0 standard drink of alcohol     Types: 1 Standard drinks or equivalent per week     Comment: occasionally     Family History   Problem Relation Age of Onset    Melanoma Mother         colon    Thyroid Disease Mother     Glaucoma Mother     Stomach Cancer Maternal Grandmother     Colon Cancer Paternal Grandmother     Pancreatic Cancer Paternal Grandfather          Current Outpatient Medications   Medication Sig Dispense Refill    FLUoxetine (PROZAC) 20 MG capsule Take 1 capsule (20 mg) by mouth daily 90 capsule 3    levothyroxine (SYNTHROID/LEVOTHROID) 112 MCG tablet Take 1 tablet (112 mcg) by mouth daily 90 tablet 3    multivitamin w/minerals (MULTI-VITAMIN) tablet Take 1 tablet by mouth daily      pravastatin (PRAVACHOL) 20 MG tablet Take 1 tablet (20 mg) by mouth daily 90 tablet 3       Reviewed and updated as needed this visit by clinical staff   Tobacco  Allergies               Reviewed and updated as needed this visit by Provider                  No past medical history on file.   Past Surgical History:   Procedure Laterality Date    OTHER SURGICAL HISTORY  10/13/2021    Inspire     Review of Systems    Review of Systems  Constitutional, HEENT, cardiovascular, pulmonary, gi and gu systems are negative, except as otherwise noted.    OBJECTIVE:   /78 (BP Location: Right arm, Patient Position: Sitting, Cuff Size: Adult Large)   Pulse 59   Temp 97.6  F (36.4  C) (Temporal)   Ht 1.753 m (5' 9\")   Wt 80.3 kg (177 lb)   SpO2 96%   BMI 26.14 kg/m     Estimated body mass index is 26.14 " "kg/m  as calculated from the following:    Height as of this encounter: 1.753 m (5' 9\").    Weight as of this encounter: 80.3 kg (177 lb).  Physical Exam  GENERAL: alert and no distress  EYES: Eyes grossly normal to inspection, PERRL and conjunctivae and sclerae normal  HENT: ear canals and TM's normal, nose and mouth without ulcers or lesions  NECK: no adenopathy, no asymmetry, masses, or scars  RESP: lungs clear to auscultation - no rales, rhonchi or wheezes  CV: regular rate and rhythm, normal S1 S2, no S3 or S4, no murmur, click or rub, no peripheral edema  ABDOMEN: soft, nontender, no hepatosplenomegaly, no masses and bowel sounds normal  MS: no gross musculoskeletal defects noted, no edema  SKIN: no suspicious lesions or rashes  NEURO: Normal strength and tone, mentation intact and speech normal  PSYCH: mentation appears normal, affect normal/bright  Declined testicular and prostate exams    Diagnostic Test Results:  Labs reviewed in Epic  Results for orders placed or performed in visit on 06/19/24   Lipid Panel ([facility])     Status: None   Result Value Ref Range    Cholesterol 197 0 - 199 mg/dL    Triglycerides 57 0 - 149 mg/dL    HDL Cholesterol 76 40 - 150 mg/dL    LDL-C 110 mg/dL    Cholesterol/HDL Ratio 3 0 - 5   Comprehensive Metobolic Panel ([facility])     Status: None   Result Value Ref Range    Carbon Dioxide 26.7 20 - 32 mmol/L    Creatinine 1.00 0.60 - 1.30 mg/dL    Glucose 98 60 - 99 mg/dL    Sodium 139.0 135 - 146 mmol/L    Potassium 5.0 3.5 - 5.3 mmol/L    Chloride 105.9 98 - 110 mmol/L    Protein Total 6.8 6.1 - 8.1 g/dL    Albumin 4.2 3.6 - 5.1 g/dL    Alkaline Phosphatase 47 33 - 130 U/L    ALT 28 0 - 32 U/L    AST 22 0 - 35 U/L    Bilirubin Total 0.9 0.2 - 1.2 mg/dL    Urea Nitrogen 13 7 - 25 mg/dL    Calcium 9.8 8.6 - 10.3 mg/dL    BUN/Creatinine Ratio 13 6 - 32       ASSESSMENT/PLAN:   1. Encounter for routine history and physical exam for male      2. MIKE (generalized anxiety disorder)  Controlled " "on medications, refilled.  - FLUoxetine (PROZAC) 20 MG capsule; Take 1 capsule (20 mg) by mouth daily  Dispense: 90 capsule; Refill: 3    3. Hashimoto's thyroiditis  Check labs, refilled medications.  - levothyroxine (SYNTHROID/LEVOTHROID) 112 MCG tablet; Take 1 tablet (112 mcg) by mouth daily  Dispense: 90 tablet; Refill: 3  - TSH WITH FREE T4 REFLEX ([facility])    4. Hyperlipidemia LDL goal <130  Check fasting labs, refilled medications.  - pravastatin (PRAVACHOL) 20 MG tablet; Take 1 tablet (20 mg) by mouth daily  Dispense: 90 tablet; Refill: 3  - VENOUS COLLECTION  - Lipid Panel ([facility])  - Comprehensive Metobolic Panel ([facility])     Psa not done, it has been less than 1 year since last check. Declined prostate exam today    Patient has been advised of split billing requirements and indicates understanding: Yes      Counseling  Reviewed preventive health counseling, as reflected in patient instructions       Regular exercise       Healthy diet/nutrition      BMI  Estimated body mass index is 26.14 kg/m  as calculated from the following:    Height as of this encounter: 1.753 m (5' 9\").    Weight as of this encounter: 80.3 kg (177 lb).         He reports that he has never smoked. He has never been exposed to tobacco smoke. He has never used smokeless tobacco.            Signed Electronically by: Shae Werner MD  "

## 2024-06-17 PROBLEM — Z76.89 HEALTH CARE HOME: Status: RESOLVED | Noted: 2017-10-04 | Resolved: 2024-06-17

## 2024-06-18 DIAGNOSIS — E06.3 HASHIMOTO'S THYROIDITIS: ICD-10-CM

## 2024-06-18 RX ORDER — LEVOTHYROXINE SODIUM 112 UG/1
112 TABLET ORAL DAILY
COMMUNITY
Start: 2024-06-18

## 2024-06-18 NOTE — TELEPHONE ENCOUNTER
Claude Dorsey is requesting a refill of:    Refused Prescriptions:                       Disp   Refills    levothyroxine (SYNTHROID/LEVOTHROID) 112 M*                Sig: TAKE 1 TABLET BY MOUTH DAILY  Refused By: RUTH DAMICO  Reason for Refusal: Patient needs appointment    Pt has OV 6/19/24

## 2024-06-19 ENCOUNTER — OFFICE VISIT (OUTPATIENT)
Dept: FAMILY MEDICINE | Facility: CLINIC | Age: 59
End: 2024-06-19

## 2024-06-19 VITALS
DIASTOLIC BLOOD PRESSURE: 78 MMHG | BODY MASS INDEX: 26.22 KG/M2 | SYSTOLIC BLOOD PRESSURE: 116 MMHG | OXYGEN SATURATION: 96 % | HEIGHT: 69 IN | HEART RATE: 59 BPM | WEIGHT: 177 LBS | TEMPERATURE: 97.6 F

## 2024-06-19 DIAGNOSIS — Z00.00 ENCOUNTER FOR ROUTINE HISTORY AND PHYSICAL EXAM FOR MALE: Primary | ICD-10-CM

## 2024-06-19 DIAGNOSIS — E78.5 HYPERLIPIDEMIA LDL GOAL <130: ICD-10-CM

## 2024-06-19 DIAGNOSIS — F41.1 GAD (GENERALIZED ANXIETY DISORDER): ICD-10-CM

## 2024-06-19 DIAGNOSIS — E06.3 HASHIMOTO'S THYROIDITIS: ICD-10-CM

## 2024-06-19 LAB
ALBUMIN SERPL-MCNC: 4.2 G/DL (ref 3.6–5.1)
ALP SERPL-CCNC: 47 U/L (ref 33–130)
ALT 1742-6: 28 U/L (ref 0–32)
AST 1920-8: 22 U/L (ref 0–35)
BILIRUB SERPL-MCNC: 0.9 MG/DL (ref 0.2–1.2)
BUN SERPL-MCNC: 13 MG/DL (ref 7–25)
BUN/CREATININE RATIO: 13 (ref 6–32)
CALCIUM SERPL-MCNC: 9.8 MG/DL (ref 8.6–10.3)
CHLORIDE SERPLBLD-SCNC: 105.9 MMOL/L (ref 98–110)
CHOLEST SERPL-MCNC: 197 MG/DL (ref 0–199)
CHOLEST/HDLC SERPL: 3 {RATIO} (ref 0–5)
CO2 SERPL-SCNC: 26.7 MMOL/L (ref 20–32)
CREAT SERPL-MCNC: 1 MG/DL (ref 0.6–1.3)
GLUCOSE SERPL-MCNC: 98 MG/DL (ref 60–99)
HDLC SERPL-MCNC: 76 MG/DL (ref 40–150)
LDLC SERPL CALC-MCNC: 110 MG/DL
POTASSIUM SERPL-SCNC: 5 MMOL/L (ref 3.5–5.3)
PROT SERPL-MCNC: 6.8 G/DL (ref 6.1–8.1)
SODIUM SERPL-SCNC: 139 MMOL/L (ref 135–146)
TRIGL SERPL-MCNC: 57 MG/DL (ref 0–149)

## 2024-06-19 PROCEDURE — 36415 COLL VENOUS BLD VENIPUNCTURE: CPT | Performed by: FAMILY MEDICINE

## 2024-06-19 PROCEDURE — 84443 ASSAY THYROID STIM HORMONE: CPT | Mod: 90 | Performed by: FAMILY MEDICINE

## 2024-06-19 PROCEDURE — 80053 COMPREHEN METABOLIC PANEL: CPT | Performed by: FAMILY MEDICINE

## 2024-06-19 PROCEDURE — 99386 PREV VISIT NEW AGE 40-64: CPT | Performed by: FAMILY MEDICINE

## 2024-06-19 PROCEDURE — 80061 LIPID PANEL: CPT | Performed by: FAMILY MEDICINE

## 2024-06-19 RX ORDER — LEVOTHYROXINE SODIUM 112 UG/1
112 TABLET ORAL DAILY
Qty: 90 TABLET | Refills: 3 | Status: SHIPPED | OUTPATIENT
Start: 2024-06-19

## 2024-06-19 RX ORDER — PRAVASTATIN SODIUM 20 MG
20 TABLET ORAL DAILY
Qty: 90 TABLET | Refills: 3 | Status: SHIPPED | OUTPATIENT
Start: 2024-06-19

## 2024-06-19 ASSESSMENT — ANXIETY QUESTIONNAIRES
GAD7 TOTAL SCORE: 1
6. BECOMING EASILY ANNOYED OR IRRITABLE: SEVERAL DAYS
3. WORRYING TOO MUCH ABOUT DIFFERENT THINGS: NOT AT ALL
GAD7 TOTAL SCORE: 1
IF YOU CHECKED OFF ANY PROBLEMS ON THIS QUESTIONNAIRE, HOW DIFFICULT HAVE THESE PROBLEMS MADE IT FOR YOU TO DO YOUR WORK, TAKE CARE OF THINGS AT HOME, OR GET ALONG WITH OTHER PEOPLE: NOT DIFFICULT AT ALL
5. BEING SO RESTLESS THAT IT IS HARD TO SIT STILL: NOT AT ALL
1. FEELING NERVOUS, ANXIOUS, OR ON EDGE: NOT AT ALL
7. FEELING AFRAID AS IF SOMETHING AWFUL MIGHT HAPPEN: NOT AT ALL
2. NOT BEING ABLE TO STOP OR CONTROL WORRYING: NOT AT ALL

## 2024-06-19 ASSESSMENT — PATIENT HEALTH QUESTIONNAIRE - PHQ9
SUM OF ALL RESPONSES TO PHQ QUESTIONS 1-9: 3
5. POOR APPETITE OR OVEREATING: NOT AT ALL

## 2024-06-19 NOTE — NURSING NOTE
Chief Complaint   Patient presents with    Physical     Fasting today    Recheck Medication     Refill medications     Pre-visit Screening:  Immunizations:  up to date  Colonoscopy:  is up to date  Mammogram: NA  Asthma Action Test/Plan:  NA  PHQ9:  Done today  GAD7:  Done today  Questioned patient about current smoking habits Pt. has never smoked.  Ok to leave detailed message on voice mail for today's visit only Yes, phone # [phone]

## 2024-06-20 LAB — TSH SERPL-ACNC: 1.66 MIU/L (ref 0.4–4.5)

## 2025-06-28 DIAGNOSIS — E06.3 HASHIMOTO'S THYROIDITIS: ICD-10-CM

## 2025-06-28 DIAGNOSIS — E78.5 HYPERLIPIDEMIA LDL GOAL <130: ICD-10-CM

## 2025-06-30 RX ORDER — PRAVASTATIN SODIUM 20 MG
20 TABLET ORAL DAILY
COMMUNITY
Start: 2025-06-30

## 2025-06-30 RX ORDER — LEVOTHYROXINE SODIUM 112 UG/1
112 TABLET ORAL DAILY
COMMUNITY
Start: 2025-06-30

## 2025-06-30 NOTE — TELEPHONE ENCOUNTER
Claude Dorsey is requesting a refill of:    Refused Prescriptions:                       Disp   Refills    levothyroxine (SYNTHROID/LEVOTHROID) 112 M*                Sig: TAKE 1 TABLET BY MOUTH DAILY  Refused By: ROMEO HERNANDEZ  Reason for Refusal: Patient needs appointment    pravastatin (PRAVACHOL) 20 MG tablet [Phar*                Sig: TAKE 1 TABLET BY MOUTH EVERY DAY  Refused By: ROMEO HERNANDEZ  Reason for Refusal: Patient needs appointment    Pt due for FASTING CPX